# Patient Record
Sex: MALE | Race: WHITE | Employment: OTHER | URBAN - NONMETROPOLITAN AREA
[De-identification: names, ages, dates, MRNs, and addresses within clinical notes are randomized per-mention and may not be internally consistent; named-entity substitution may affect disease eponyms.]

---

## 2017-02-07 ENCOUNTER — TELEPHONE (OUTPATIENT)
Dept: NEUROLOGY | Age: 82
End: 2017-02-07

## 2017-02-18 ENCOUNTER — HOSPITAL ENCOUNTER (OUTPATIENT)
Age: 82
Setting detail: OBSERVATION
Discharge: HOME | End: 2017-02-19
Attending: EMERGENCY MEDICINE | Admitting: INTERNAL MEDICINE
Payer: MEDICARE

## 2017-02-18 ENCOUNTER — APPOINTMENT (OUTPATIENT)
Dept: GENERAL RADIOLOGY | Age: 82
End: 2017-02-18
Payer: MEDICARE

## 2017-02-18 ENCOUNTER — APPOINTMENT (OUTPATIENT)
Dept: CT IMAGING | Age: 82
End: 2017-02-18
Payer: MEDICARE

## 2017-02-18 DIAGNOSIS — R07.9 CHEST PAIN, UNSPECIFIED TYPE: Primary | ICD-10-CM

## 2017-02-18 DIAGNOSIS — R55 NEAR SYNCOPE: ICD-10-CM

## 2017-02-18 PROBLEM — E03.9 HYPOTHYROIDISM: Status: ACTIVE | Noted: 2017-02-18

## 2017-02-18 PROBLEM — I65.29 CAROTID ARTERY STENOSIS: Status: ACTIVE | Noted: 2017-02-18

## 2017-02-18 PROBLEM — R29.6 FALLS FREQUENTLY: Status: ACTIVE | Noted: 2017-02-18

## 2017-02-18 PROBLEM — I10 HYPERTENSION: Status: ACTIVE | Noted: 2017-02-18

## 2017-02-18 PROBLEM — G20 PARKINSON DISEASE (HCC): Status: ACTIVE | Noted: 2017-02-18

## 2017-02-18 PROBLEM — E78.5 HYPERLIPIDEMIA: Status: ACTIVE | Noted: 2017-02-18

## 2017-02-18 PROBLEM — I48.91 ATRIAL FIBRILLATION WITH NORMAL VENTRICULAR RATE (HCC): Status: ACTIVE | Noted: 2017-02-18

## 2017-02-18 LAB
ALBUMIN SERPL-MCNC: 4.2 G/DL (ref 3.5–5.2)
ALP BLD-CCNC: 86 U/L (ref 40–130)
ALT SERPL-CCNC: <5 U/L (ref 5–41)
ANION GAP SERPL CALCULATED.3IONS-SCNC: 16 MMOL/L (ref 7–19)
AST SERPL-CCNC: 11 U/L (ref 5–40)
BASOPHILS ABSOLUTE: 0 K/UL (ref 0–0.2)
BASOPHILS RELATIVE PERCENT: 0.3 % (ref 0–1)
BILIRUB SERPL-MCNC: 0.9 MG/DL (ref 0.2–1.2)
BUN BLDV-MCNC: 18 MG/DL (ref 8–23)
CALCIUM SERPL-MCNC: 9.1 MG/DL (ref 8.8–10.2)
CHLORIDE BLD-SCNC: 97 MMOL/L (ref 98–111)
CO2: 25 MMOL/L (ref 22–29)
CREAT SERPL-MCNC: 1.1 MG/DL (ref 0.5–1.2)
D DIMER: 0.56 NG/ML DDU (ref 0–0.48)
EOSINOPHILS ABSOLUTE: 0 K/UL (ref 0–0.6)
EOSINOPHILS RELATIVE PERCENT: 0.4 % (ref 0–5)
GFR NON-AFRICAN AMERICAN: >60
GLOBULIN: 3.4 G/DL
GLUCOSE BLD-MCNC: 125 MG/DL (ref 74–109)
HCT VFR BLD CALC: 38.8 % (ref 42–52)
HEMOGLOBIN: 13.4 G/DL (ref 14–18)
INR BLD: 1.04 (ref 0.88–1.18)
LYMPHOCYTES ABSOLUTE: 0.9 K/UL (ref 1.1–4.5)
LYMPHOCYTES RELATIVE PERCENT: 9.6 % (ref 20–40)
MCH RBC QN AUTO: 28.3 PG (ref 27–31)
MCHC RBC AUTO-ENTMCNC: 34.5 G/DL (ref 33–37)
MCV RBC AUTO: 81.9 FL (ref 80–94)
MONOCYTES ABSOLUTE: 0.4 K/UL (ref 0–0.9)
MONOCYTES RELATIVE PERCENT: 4.4 % (ref 0–10)
NEUTROPHILS ABSOLUTE: 7.7 K/UL (ref 1.5–7.5)
NEUTROPHILS RELATIVE PERCENT: 85.3 % (ref 50–65)
PDW BLD-RTO: 15 % (ref 11.5–14.5)
PERFORMED ON: NORMAL
PLATELET # BLD: 173 K/UL (ref 130–400)
PMV BLD AUTO: 10.6 FL (ref 7.4–10.4)
POC TROPONIN I: 0.01 NG/ML (ref 0–0.08)
POTASSIUM SERPL-SCNC: 3.9 MMOL/L (ref 3.5–5)
PRO-BNP: 1269 PG/ML (ref 0–1800)
PROTHROMBIN TIME: 13.6 SEC (ref 12–14.6)
RBC # BLD: 4.74 M/UL (ref 4.7–6.1)
SODIUM BLD-SCNC: 138 MMOL/L (ref 136–145)
TOTAL PROTEIN: 7.6 G/DL (ref 6.6–8.7)
TROPONIN: <0.01 NG/ML (ref 0–0.03)
TSH SERPL DL<=0.05 MIU/L-ACNC: 1.34 UIU/ML (ref 0.27–4.2)
WBC # BLD: 9 K/UL (ref 4.8–10.8)

## 2017-02-18 PROCEDURE — 84484 ASSAY OF TROPONIN QUANT: CPT

## 2017-02-18 PROCEDURE — 71275 CT ANGIOGRAPHY CHEST: CPT

## 2017-02-18 PROCEDURE — 99285 EMERGENCY DEPT VISIT HI MDM: CPT

## 2017-02-18 PROCEDURE — 71010 XR CHEST PORTABLE: CPT

## 2017-02-18 PROCEDURE — 84443 ASSAY THYROID STIM HORMONE: CPT

## 2017-02-18 PROCEDURE — 2500000003 HC RX 250 WO HCPCS: Performed by: INTERNAL MEDICINE

## 2017-02-18 PROCEDURE — 36415 COLL VENOUS BLD VENIPUNCTURE: CPT

## 2017-02-18 PROCEDURE — G0378 HOSPITAL OBSERVATION PER HR: HCPCS

## 2017-02-18 PROCEDURE — 85379 FIBRIN DEGRADATION QUANT: CPT

## 2017-02-18 PROCEDURE — 94664 DEMO&/EVAL PT USE INHALER: CPT

## 2017-02-18 PROCEDURE — 85025 COMPLETE CBC W/AUTO DIFF WBC: CPT

## 2017-02-18 PROCEDURE — 83880 ASSAY OF NATRIURETIC PEPTIDE: CPT

## 2017-02-18 PROCEDURE — 2580000003 HC RX 258: Performed by: EMERGENCY MEDICINE

## 2017-02-18 PROCEDURE — 99284 EMERGENCY DEPT VISIT MOD MDM: CPT | Performed by: EMERGENCY MEDICINE

## 2017-02-18 PROCEDURE — 2580000003 HC RX 258: Performed by: INTERNAL MEDICINE

## 2017-02-18 PROCEDURE — 6360000004 HC RX CONTRAST MEDICATION: Performed by: EMERGENCY MEDICINE

## 2017-02-18 PROCEDURE — 93005 ELECTROCARDIOGRAM TRACING: CPT

## 2017-02-18 PROCEDURE — 80053 COMPREHEN METABOLIC PANEL: CPT

## 2017-02-18 PROCEDURE — 85610 PROTHROMBIN TIME: CPT

## 2017-02-18 PROCEDURE — 99220 PR INITIAL OBSERVATION CARE/DAY 70 MINUTES: CPT | Performed by: INTERNAL MEDICINE

## 2017-02-18 RX ORDER — CARBIDOPA/LEVODOPA 25MG-250MG
1 TABLET ORAL 4 TIMES DAILY
Status: DISCONTINUED | OUTPATIENT
Start: 2017-02-18 | End: 2017-02-19 | Stop reason: HOSPADM

## 2017-02-18 RX ORDER — MIDODRINE HYDROCHLORIDE 5 MG/1
5 TABLET ORAL
Status: DISCONTINUED | OUTPATIENT
Start: 2017-02-18 | End: 2017-02-19 | Stop reason: HOSPADM

## 2017-02-18 RX ORDER — SODIUM CHLORIDE 0.9 % (FLUSH) 0.9 %
10 SYRINGE (ML) INJECTION PRN
Status: DISCONTINUED | OUTPATIENT
Start: 2017-02-18 | End: 2017-02-19 | Stop reason: HOSPADM

## 2017-02-18 RX ORDER — ONDANSETRON 2 MG/ML
4 INJECTION INTRAMUSCULAR; INTRAVENOUS EVERY 6 HOURS PRN
Status: DISCONTINUED | OUTPATIENT
Start: 2017-02-18 | End: 2017-02-19 | Stop reason: HOSPADM

## 2017-02-18 RX ORDER — ATORVASTATIN CALCIUM 20 MG/1
10 TABLET, FILM COATED ORAL DAILY
Status: DISCONTINUED | OUTPATIENT
Start: 2017-02-18 | End: 2017-02-19 | Stop reason: HOSPADM

## 2017-02-18 RX ORDER — LEVOTHYROXINE SODIUM 0.12 MG/1
125 TABLET ORAL DAILY
Status: DISCONTINUED | OUTPATIENT
Start: 2017-02-19 | End: 2017-02-19 | Stop reason: HOSPADM

## 2017-02-18 RX ORDER — CLOPIDOGREL BISULFATE 75 MG/1
75 TABLET ORAL DAILY
Status: DISCONTINUED | OUTPATIENT
Start: 2017-02-18 | End: 2017-02-19 | Stop reason: HOSPADM

## 2017-02-18 RX ORDER — ASPIRIN 81 MG/1
81 TABLET ORAL DAILY
Status: DISCONTINUED | OUTPATIENT
Start: 2017-02-19 | End: 2017-02-19 | Stop reason: HOSPADM

## 2017-02-18 RX ORDER — ACETAMINOPHEN 325 MG/1
650 TABLET ORAL EVERY 4 HOURS PRN
Status: DISCONTINUED | OUTPATIENT
Start: 2017-02-18 | End: 2017-02-19 | Stop reason: HOSPADM

## 2017-02-18 RX ORDER — 0.9 % SODIUM CHLORIDE 0.9 %
500 INTRAVENOUS SOLUTION INTRAVENOUS ONCE
Status: COMPLETED | OUTPATIENT
Start: 2017-02-18 | End: 2017-02-18

## 2017-02-18 RX ORDER — ASPIRIN 81 MG/1
81 TABLET, CHEWABLE ORAL DAILY
Status: DISCONTINUED | OUTPATIENT
Start: 2017-02-19 | End: 2017-02-18

## 2017-02-18 RX ORDER — LEVOTHYROXINE SODIUM 0.1 MG/1
100 TABLET ORAL DAILY
Status: DISCONTINUED | OUTPATIENT
Start: 2017-02-18 | End: 2017-02-18

## 2017-02-18 RX ORDER — FINASTERIDE 5 MG/1
5 TABLET, FILM COATED ORAL DAILY
Status: DISCONTINUED | OUTPATIENT
Start: 2017-02-19 | End: 2017-02-19 | Stop reason: HOSPADM

## 2017-02-18 RX ORDER — ENTACAPONE 200 MG/1
200 TABLET ORAL 4 TIMES DAILY
Status: DISCONTINUED | OUTPATIENT
Start: 2017-02-18 | End: 2017-02-19 | Stop reason: HOSPADM

## 2017-02-18 RX ORDER — SODIUM CHLORIDE 0.9 % (FLUSH) 0.9 %
10 SYRINGE (ML) INJECTION EVERY 12 HOURS SCHEDULED
Status: DISCONTINUED | OUTPATIENT
Start: 2017-02-18 | End: 2017-02-19 | Stop reason: HOSPADM

## 2017-02-18 RX ADMIN — ATORVASTATIN CALCIUM 10 MG: 20 TABLET, FILM COATED ORAL at 21:11

## 2017-02-18 RX ADMIN — Medication 10 ML: at 19:48

## 2017-02-18 RX ADMIN — ENTACAPONE 200 MG: 200 TABLET ORAL at 21:10

## 2017-02-18 RX ADMIN — Medication 1 TABLET: at 21:10

## 2017-02-18 RX ADMIN — IOVERSOL 90 ML: 741 INJECTION INTRA-ARTERIAL; INTRAVENOUS at 13:46

## 2017-02-18 RX ADMIN — SODIUM BICARBONATE: 84 INJECTION INTRAVENOUS at 19:45

## 2017-02-18 RX ADMIN — SODIUM CHLORIDE 500 ML: 9 INJECTION, SOLUTION INTRAVENOUS at 13:48

## 2017-02-18 ASSESSMENT — ENCOUNTER SYMPTOMS
ABDOMINAL PAIN: 0
SHORTNESS OF BREATH: 0
WHEEZING: 0
COUGH: 0
EYES NEGATIVE: 1
ABDOMINAL DISTENTION: 0
APNEA: 0
VOMITING: 0
NAUSEA: 0
STRIDOR: 0
SINUS PRESSURE: 0
RHINORRHEA: 0

## 2017-02-18 ASSESSMENT — PAIN SCALES - GENERAL: PAINLEVEL_OUTOF10: 0

## 2017-02-19 VITALS
DIASTOLIC BLOOD PRESSURE: 56 MMHG | WEIGHT: 240.1 LBS | SYSTOLIC BLOOD PRESSURE: 96 MMHG | OXYGEN SATURATION: 95 % | TEMPERATURE: 97.4 F | RESPIRATION RATE: 16 BRPM | HEIGHT: 72 IN | HEART RATE: 66 BPM | BODY MASS INDEX: 32.52 KG/M2

## 2017-02-19 LAB
ANION GAP SERPL CALCULATED.3IONS-SCNC: 14 MMOL/L (ref 7–19)
BASOPHILS ABSOLUTE: 0 K/UL (ref 0–0.2)
BASOPHILS RELATIVE PERCENT: 0.3 % (ref 0–1)
BUN BLDV-MCNC: 22 MG/DL (ref 8–23)
CALCIUM SERPL-MCNC: 8.4 MG/DL (ref 8.8–10.2)
CHLORIDE BLD-SCNC: 103 MMOL/L (ref 98–111)
CHOLESTEROL, TOTAL: 101 MG/DL (ref 160–199)
CO2: 24 MMOL/L (ref 22–29)
CREAT SERPL-MCNC: 1.3 MG/DL (ref 0.5–1.2)
EOSINOPHILS ABSOLUTE: 0.1 K/UL (ref 0–0.6)
EOSINOPHILS RELATIVE PERCENT: 0.7 % (ref 0–5)
GFR NON-AFRICAN AMERICAN: 52
GLUCOSE BLD-MCNC: 104 MG/DL (ref 74–109)
HCT VFR BLD CALC: 33.5 % (ref 42–52)
HDLC SERPL-MCNC: 27 MG/DL (ref 55–121)
HEMOGLOBIN: 10.9 G/DL (ref 14–18)
LDL CHOLESTEROL CALCULATED: 52 MG/DL
LYMPHOCYTES ABSOLUTE: 1.2 K/UL (ref 1.1–4.5)
LYMPHOCYTES RELATIVE PERCENT: 16.5 % (ref 20–40)
MCH RBC QN AUTO: 27.3 PG (ref 27–31)
MCHC RBC AUTO-ENTMCNC: 32.5 G/DL (ref 33–37)
MCV RBC AUTO: 84 FL (ref 80–94)
MONOCYTES ABSOLUTE: 0.4 K/UL (ref 0–0.9)
MONOCYTES RELATIVE PERCENT: 6.2 % (ref 0–10)
NEUTROPHILS ABSOLUTE: 5.4 K/UL (ref 1.5–7.5)
NEUTROPHILS RELATIVE PERCENT: 76.2 % (ref 50–65)
PDW BLD-RTO: 15 % (ref 11.5–14.5)
PLATELET # BLD: 160 K/UL (ref 130–400)
PMV BLD AUTO: 10.1 FL (ref 7.4–10.4)
POTASSIUM SERPL-SCNC: 3.8 MMOL/L (ref 3.5–5)
RBC # BLD: 3.99 M/UL (ref 4.7–6.1)
SODIUM BLD-SCNC: 141 MMOL/L (ref 136–145)
TRIGL SERPL-MCNC: 110 MG/DL (ref 150–199)
WBC # BLD: 7.1 K/UL (ref 4.8–10.8)

## 2017-02-19 PROCEDURE — 80061 LIPID PANEL: CPT

## 2017-02-19 PROCEDURE — 85025 COMPLETE CBC W/AUTO DIFF WBC: CPT

## 2017-02-19 PROCEDURE — G0378 HOSPITAL OBSERVATION PER HR: HCPCS

## 2017-02-19 PROCEDURE — 80048 BASIC METABOLIC PNL TOTAL CA: CPT

## 2017-02-19 PROCEDURE — 93306 TTE W/DOPPLER COMPLETE: CPT

## 2017-02-19 PROCEDURE — 99217 PR OBSERVATION CARE DISCHARGE MANAGEMENT: CPT | Performed by: INTERNAL MEDICINE

## 2017-02-19 PROCEDURE — 6370000000 HC RX 637 (ALT 250 FOR IP): Performed by: INTERNAL MEDICINE

## 2017-02-19 PROCEDURE — 36415 COLL VENOUS BLD VENIPUNCTURE: CPT

## 2017-02-19 RX ADMIN — ENTACAPONE 200 MG: 200 TABLET ORAL at 09:57

## 2017-02-19 RX ADMIN — LEVOTHYROXINE SODIUM 125 MCG: 125 TABLET ORAL at 06:16

## 2017-02-19 RX ADMIN — MIDODRINE HYDROCHLORIDE 5 MG: 5 TABLET ORAL at 09:55

## 2017-02-19 RX ADMIN — Medication 1 TABLET: at 17:53

## 2017-02-19 RX ADMIN — ENTACAPONE 200 MG: 200 TABLET ORAL at 13:04

## 2017-02-19 RX ADMIN — CLOPIDOGREL BISULFATE 75 MG: 75 TABLET ORAL at 09:57

## 2017-02-19 RX ADMIN — ASPIRIN 81 MG: 81 TABLET ORAL at 09:59

## 2017-02-19 RX ADMIN — Medication 1 TABLET: at 13:04

## 2017-02-19 RX ADMIN — FINASTERIDE 5 MG: 5 TABLET, FILM COATED ORAL at 09:59

## 2017-02-19 RX ADMIN — Medication 1 TABLET: at 09:56

## 2017-02-19 RX ADMIN — ENTACAPONE 200 MG: 200 TABLET ORAL at 17:53

## 2017-02-19 RX ADMIN — MIDODRINE HYDROCHLORIDE 5 MG: 5 TABLET ORAL at 13:04

## 2017-02-19 ASSESSMENT — PAIN SCALES - GENERAL
PAINLEVEL_OUTOF10: 0

## 2017-02-22 ENCOUNTER — OFFICE VISIT (OUTPATIENT)
Dept: UROLOGY | Facility: CLINIC | Age: 82
End: 2017-02-22

## 2017-02-22 VITALS — HEIGHT: 71 IN | WEIGHT: 250 LBS | BODY MASS INDEX: 35 KG/M2

## 2017-02-22 DIAGNOSIS — N32.81 OAB (OVERACTIVE BLADDER): ICD-10-CM

## 2017-02-22 DIAGNOSIS — C61 CANCER OF PROSTATE (HCC): Primary | ICD-10-CM

## 2017-02-22 LAB
EKG P AXIS: NORMAL DEGREES
EKG P-R INTERVAL: NORMAL MS
EKG Q-T INTERVAL: 394 MS
EKG QRS DURATION: 100 MS
EKG QTC CALCULATION (BAZETT): 443 MS
EKG T AXIS: -22 DEGREES

## 2017-02-22 PROCEDURE — 99213 OFFICE O/P EST LOW 20 MIN: CPT | Performed by: UROLOGY

## 2017-02-22 RX ORDER — FINASTERIDE 5 MG/1
5 TABLET, FILM COATED ORAL DAILY
COMMUNITY
End: 2017-08-04 | Stop reason: SDUPTHER

## 2017-02-22 RX ORDER — LEVOTHYROXINE SODIUM 0.07 MG/1
75 TABLET ORAL DAILY
COMMUNITY

## 2017-02-22 RX ORDER — AMIODARONE HYDROCHLORIDE 200 MG/1
200 TABLET ORAL DAILY
COMMUNITY
End: 2018-02-14

## 2017-02-22 NOTE — PROGRESS NOTES
Subjective    Mr. Whitfield is 89 y.o. male    CHIEF COMPLAINT: Prostate cancer    The patient is about 2-1/2 years now treatment for prostate cancer he had a stage TIc with a Bellevue score of 10 he is undergone external beam radiotherapy with neoadjuvant hormonal therapy he has now been off hormonal therapy since 2015 his PSAs are remaining quite low his most recent PSA is again 0.1 he is on Proscar but no other urologic medications    He does have some mild obstructive voiding symptoms with urgency frequency nocturia etc. but nothing worsening when he had previously he denies any gross hematuria again no flank pain no symptoms metastatic disease such as bone pain back pain weight loss or anorexia    He has however been having hypotensive episodes in fact when he came in the office he passed out here and pre-much on his again came back quite quickly and he says his wife says he has been thoroughly worked up for this and to follow-up with Dr. Bundy      History of Present Illness      The following portions of the patient's history were reviewed and updated as appropriate: allergies, current medications, past family history, past medical history, past social history, past surgical history and problem list.    Review of Systems   Constitutional: Positive for activity change and fatigue. Negative for chills and fever.   Gastrointestinal: Negative for abdominal distention, abdominal pain, anal bleeding, blood in stool and nausea.   Genitourinary: Positive for urgency. Negative for difficulty urinating, dysuria, flank pain, frequency, hematuria, penile pain and penile swelling.   Neurological: Positive for dizziness, syncope and light-headedness.   Psychiatric/Behavioral: Negative.  Negative for agitation and confusion.         Current Outpatient Prescriptions:   •  amiodarone (PACERONE) 200 MG tablet, Take 200 mg by mouth Daily., Disp: , Rfl:   •  finasteride (PROSCAR) 5 MG tablet, Take 5 mg by mouth Daily., Disp: ,  "Rfl:   •  FOLIC ACID PO, Take  by mouth., Disp: , Rfl:   •  HYDROCHLOROTHIAZIDE PO, Take  by mouth., Disp: , Rfl:   •  levothyroxine (SYNTHROID, LEVOTHROID) 75 MCG tablet, Take 75 mcg by mouth Daily., Disp: , Rfl:   •  METOPROLOL TARTRATE PO, Take  by mouth., Disp: , Rfl:   •  SIMVASTATIN PO, Take  by mouth., Disp: , Rfl:   •  VALSARTAN PO, Take  by mouth., Disp: , Rfl:     Past Medical History   Diagnosis Date   • BPH (benign prostatic hypertrophy) with urinary obstruction    • Elevated PSA    • Hypertension    • Parkinson disease        Past Surgical History   Procedure Laterality Date   • Cardiac catheterization         Social History     Social History   • Marital status:      Spouse name: N/A   • Number of children: N/A   • Years of education: N/A     Social History Main Topics   • Smoking status: Never Smoker   • Smokeless tobacco: None   • Alcohol use Yes      Comment: occasionally   • Drug use: No   • Sexual activity: Not Asked     Other Topics Concern   • None     Social History Narrative       Family History   Problem Relation Age of Onset   • Heart disease Father    • Cancer Mother        Objective    Visit Vitals   • Ht 71\" (180.3 cm)   • Wt 250 lb (113 kg)   • BMI 34.87 kg/m2       Physical Exam      Lab Requisition on 11/07/2016   Component Date Value Ref Range Status   • Case Report 11/07/2016    Final                    Value:Surgical Pathology Report                         Case: [id number]                                  Authorizing Provider:  Scottie Mix MD           Collected:           11/07/2016 09:05 AM          Pathologist:           Leonid Sanchez MD    Received:            11/08/2016 09:06 AM          Specimens:   1) - Colon, Colon polyp at 25 cm                                                                    2) - Colon, Colon polyp at 135 cm                                                         • Clinical Information 11/07/2016    Final                    Value:This " "result contains rich text formatting which cannot be displayed here.   • Final Diagnosis 11/07/2016    Final                    Value:This result contains rich text formatting which cannot be displayed here.   • Gross Description 11/07/2016    Final                    Value:This result contains rich text formatting which cannot be displayed here.   • Microscopic Description 11/07/2016    Final                    Value:This result contains rich text formatting which cannot be displayed here.       Results for orders placed or performed in visit on 11/07/16   Tissue Exam   Result Value Ref Range    Case Report       Surgical Pathology Report                         Case: [id number]                                  Authorizing Provider:  Scottie Mix MD           Collected:           11/07/2016 09:05 AM          Pathologist:           Leonid Sanchez MD    Received:            11/08/2016 09:06 AM          Specimens:   1) - Colon, Colon polyp at 25 cm                                                                    2) - Colon, Colon polyp at 135 cm                                                          Clinical Information       Pre-Op Diagnosis:      History of colon polyps.       Final Diagnosis       1.     Polyp, colon at 25 cm, polypectomy:               Tubular adenoma with no evidence of high grade dysplasia.    2.     Polyp, colon at 135 cm, polypectomy:               Tubular adenoma with no evidence of high grade dysplasia.                                          1      Gross Description       Specimen #1 is received in formalin, labeled with the patient's name, medical record number and \"colon polyp at 25 cm.\" The specimen consists of one red-brown soft tissue polyp measuring 0.4 x 0.4 x 0.2 cm. The polyp is bisected and totally submitted in (block 1A).      Specimen #2 is received in formalin, labeled with the patient's name, medical record number and \"colon polyp at 135 cm.\"  The specimen consists " of three yellow-pink soft tissue fragments aggregating to 0.6 x 0.5 x 0.1 cm, totally submitted in (block 2A).   JBT/js          Microscopic Description       1.     Sections demonstrate polypoid fragments of colonic mucosa with glandular architecture and low grade adenomatous changes.  No evidence of high grade dysplasia is seen.    2.     Sections demonstrate polypoid fragments of colonic mucosa with glandular architecture and low grade adenomatous changes.  No evidence of high grade dysplasia is seen.  DRW/ltw        Embedded Images         Assessment and Plan    Javier was seen today for cancer of prostate.    Diagnoses and all orders for this visit:    Cancer of prostate    OAB (overactive bladder)    Plan--the patient seems to doing well from a prostate cancer point review again he is having more more difficulties with ambulation with a syncopal episodes etc. so I suggested in 6 months if he can come in just have home health draw a PSA and we can just call running those results.    Again he still hormonal therapy with an EGD so I do not think we needed anything different at this point and we will just seen back again in 6 months call sooner as needed and I gave him I wished him well    EMR Dragon/Transcription disclaimer:  Much of this encounter note is an electronic transcription/translation of spoken language to printed text. The electronic translation of spoken language may permit erroneous, or at times, nonsensical words or phrases to be inadvertently transcribed; although I have reviewed the note for such errors, some may still exist.

## 2017-04-19 ENCOUNTER — OFFICE VISIT (OUTPATIENT)
Dept: NEUROLOGY | Age: 82
End: 2017-04-19
Payer: MEDICARE

## 2017-04-19 VITALS
WEIGHT: 250 LBS | SYSTOLIC BLOOD PRESSURE: 154 MMHG | BODY MASS INDEX: 35 KG/M2 | HEART RATE: 69 BPM | HEIGHT: 71 IN | DIASTOLIC BLOOD PRESSURE: 72 MMHG

## 2017-04-19 DIAGNOSIS — G20 PARKINSONIAN SYNDROME ASSOCIATED WITH IDIOPATHIC ORTHOSTATIC HYPOTENSION (HCC): Primary | ICD-10-CM

## 2017-04-19 DIAGNOSIS — I65.1 BASILAR ARTERY STENOSIS: ICD-10-CM

## 2017-04-19 DIAGNOSIS — G62.9 NEUROPATHY: ICD-10-CM

## 2017-04-19 DIAGNOSIS — G20 PARKINSON DISEASE (HCC): ICD-10-CM

## 2017-04-19 DIAGNOSIS — I95.1 PARKINSONIAN SYNDROME ASSOCIATED WITH IDIOPATHIC ORTHOSTATIC HYPOTENSION (HCC): Primary | ICD-10-CM

## 2017-04-19 PROCEDURE — G8417 CALC BMI ABV UP PARAM F/U: HCPCS | Performed by: PSYCHIATRY & NEUROLOGY

## 2017-04-19 PROCEDURE — G8598 ASA/ANTIPLAT THER USED: HCPCS | Performed by: PSYCHIATRY & NEUROLOGY

## 2017-04-19 PROCEDURE — G8427 DOCREV CUR MEDS BY ELIG CLIN: HCPCS | Performed by: PSYCHIATRY & NEUROLOGY

## 2017-04-19 PROCEDURE — 4040F PNEUMOC VAC/ADMIN/RCVD: CPT | Performed by: PSYCHIATRY & NEUROLOGY

## 2017-04-19 PROCEDURE — 99214 OFFICE O/P EST MOD 30 MIN: CPT | Performed by: PSYCHIATRY & NEUROLOGY

## 2017-04-19 PROCEDURE — 1036F TOBACCO NON-USER: CPT | Performed by: PSYCHIATRY & NEUROLOGY

## 2017-04-19 PROCEDURE — 1123F ACP DISCUSS/DSCN MKR DOCD: CPT | Performed by: PSYCHIATRY & NEUROLOGY

## 2017-07-20 ENCOUNTER — OFFICE VISIT (OUTPATIENT)
Dept: NEUROLOGY | Age: 82
End: 2017-07-20
Payer: MEDICARE

## 2017-07-20 VITALS
BODY MASS INDEX: 32.9 KG/M2 | HEIGHT: 71 IN | SYSTOLIC BLOOD PRESSURE: 120 MMHG | DIASTOLIC BLOOD PRESSURE: 80 MMHG | WEIGHT: 235 LBS

## 2017-07-20 DIAGNOSIS — I65.1 BASILAR ARTERY STENOSIS: ICD-10-CM

## 2017-07-20 DIAGNOSIS — G20 PARKINSONIAN SYNDROME ASSOCIATED WITH IDIOPATHIC ORTHOSTATIC HYPOTENSION (HCC): ICD-10-CM

## 2017-07-20 DIAGNOSIS — G20 PARKINSON DISEASE (HCC): Primary | ICD-10-CM

## 2017-07-20 DIAGNOSIS — I95.1 PARKINSONIAN SYNDROME ASSOCIATED WITH IDIOPATHIC ORTHOSTATIC HYPOTENSION (HCC): ICD-10-CM

## 2017-07-20 PROCEDURE — 99213 OFFICE O/P EST LOW 20 MIN: CPT | Performed by: PSYCHIATRY & NEUROLOGY

## 2017-07-20 PROCEDURE — G8598 ASA/ANTIPLAT THER USED: HCPCS | Performed by: PSYCHIATRY & NEUROLOGY

## 2017-07-20 PROCEDURE — 1036F TOBACCO NON-USER: CPT | Performed by: PSYCHIATRY & NEUROLOGY

## 2017-07-20 PROCEDURE — 4040F PNEUMOC VAC/ADMIN/RCVD: CPT | Performed by: PSYCHIATRY & NEUROLOGY

## 2017-07-20 PROCEDURE — G8427 DOCREV CUR MEDS BY ELIG CLIN: HCPCS | Performed by: PSYCHIATRY & NEUROLOGY

## 2017-07-20 PROCEDURE — 1123F ACP DISCUSS/DSCN MKR DOCD: CPT | Performed by: PSYCHIATRY & NEUROLOGY

## 2017-07-20 PROCEDURE — G8417 CALC BMI ABV UP PARAM F/U: HCPCS | Performed by: PSYCHIATRY & NEUROLOGY

## 2017-08-04 DIAGNOSIS — N32.81 OAB (OVERACTIVE BLADDER): Primary | ICD-10-CM

## 2017-08-04 RX ORDER — FINASTERIDE 5 MG/1
TABLET, FILM COATED ORAL
Qty: 90 TABLET | Refills: 0 | Status: SHIPPED | OUTPATIENT
Start: 2017-08-04 | End: 2017-10-27 | Stop reason: SDUPTHER

## 2017-08-23 ENCOUNTER — OFFICE VISIT (OUTPATIENT)
Dept: UROLOGY | Facility: CLINIC | Age: 82
End: 2017-08-23

## 2017-08-23 VITALS
SYSTOLIC BLOOD PRESSURE: 121 MMHG | BODY MASS INDEX: 35 KG/M2 | TEMPERATURE: 98.6 F | WEIGHT: 250 LBS | HEIGHT: 71 IN | DIASTOLIC BLOOD PRESSURE: 54 MMHG

## 2017-08-23 DIAGNOSIS — C61 CANCER OF PROSTATE (HCC): Primary | ICD-10-CM

## 2017-08-23 DIAGNOSIS — N32.81 OAB (OVERACTIVE BLADDER): ICD-10-CM

## 2017-08-23 LAB
BILIRUB BLD-MCNC: NEGATIVE MG/DL
CLARITY, POC: CLEAR
COLOR UR: YELLOW
GLUCOSE UR STRIP-MCNC: NEGATIVE MG/DL
KETONES UR QL: ABNORMAL
LEUKOCYTE EST, POC: NEGATIVE
NITRITE UR-MCNC: NEGATIVE MG/ML
PH UR: 5.5 [PH] (ref 5–8)
PROT UR STRIP-MCNC: NEGATIVE MG/DL
RBC # UR STRIP: NEGATIVE /UL
SP GR UR: 1.02 (ref 1–1.03)
UROBILINOGEN UR QL: NORMAL

## 2017-08-23 PROCEDURE — 81003 URINALYSIS AUTO W/O SCOPE: CPT | Performed by: UROLOGY

## 2017-08-23 PROCEDURE — 99213 OFFICE O/P EST LOW 20 MIN: CPT | Performed by: UROLOGY

## 2017-08-23 NOTE — PROGRESS NOTES
Subjective    Mr. Whitfield is 89 y.o. male    CHIEF COMPLAINT: Prostate cancer    Patient comes back today for follow-up he is had a Gregory's grade 8 status post radiation and hormonal therapy several years ago he is been on hormones now for couple years and seems to doing just just fine he is 89 years old now he is somewhat weak but that he is doing much better than when I saw him 6 months ago infected that point I did not think 100 to seen back in office again but he is a workup and seems doing better.    He has no symptoms metastatic disease no specific bone pain back pain weight loss or anorexia his most recent PSA is 0.    He denies any significant voiding habits no gross hematuria no flank pain burning stinging etc.      History of Present Illness      The following portions of the patient's history were reviewed and updated as appropriate: allergies, current medications, past family history, past medical history, past social history, past surgical history and problem list.    Review of Systems   Constitutional: Positive for fatigue. Negative for activity change and appetite change.   Gastrointestinal: Negative for abdominal distention, abdominal pain, blood in stool and nausea.   Genitourinary: Negative for difficulty urinating, dysuria, flank pain, frequency, hematuria and urgency.   Psychiatric/Behavioral: Negative.  Negative for agitation and confusion.         Current Outpatient Prescriptions:   •  amiodarone (PACERONE) 200 MG tablet, Take 200 mg by mouth Daily., Disp: , Rfl:   •  finasteride (PROSCAR) 5 MG tablet, TAKE ONE TABLET BY MOUTH ONCE DAILY, Disp: 90 tablet, Rfl: 0  •  levothyroxine (SYNTHROID, LEVOTHROID) 75 MCG tablet, Take 75 mcg by mouth Daily., Disp: , Rfl:   •  SIMVASTATIN PO, Take  by mouth., Disp: , Rfl:   •  VALSARTAN PO, Take  by mouth., Disp: , Rfl:     Past Medical History:   Diagnosis Date   • BPH (benign prostatic hypertrophy) with urinary obstruction    • Elevated PSA    •  "Hypertension    • Parkinson disease        Past Surgical History:   Procedure Laterality Date   • CARDIAC CATHETERIZATION         Social History     Social History   • Marital status:      Spouse name: N/A   • Number of children: N/A   • Years of education: N/A     Social History Main Topics   • Smoking status: Never Smoker   • Smokeless tobacco: None   • Alcohol use Yes      Comment: occasionally   • Drug use: No   • Sexual activity: Not Asked     Other Topics Concern   • None     Social History Narrative       Family History   Problem Relation Age of Onset   • Heart disease Father    • Cancer Mother        Objective    /54  Temp 98.6 °F (37 °C)  Ht 71\" (180.3 cm)  Wt 250 lb (113 kg)  BMI 34.87 kg/m2    Physical Exam      Lab Requisition on 11/07/2016   Component Date Value Ref Range Status   • Case Report 11/07/2016    Final                    Value:Surgical Pathology Report                         Case: [id number]                                  Authorizing Provider:  Scottie Mix MD           Collected:           11/07/2016 09:05 AM          Pathologist:           Leonid Sanchez MD    Received:            11/08/2016 09:06 AM          Specimens:   1) - Colon, Colon polyp at 25 cm                                                                    2) - Colon, Colon polyp at 135 cm                                                         • Clinical Information 11/07/2016    Final                    Value:This result contains rich text formatting which cannot be displayed here.   • Final Diagnosis 11/07/2016    Final                    Value:This result contains rich text formatting which cannot be displayed here.   • Gross Description 11/07/2016    Final                    Value:This result contains rich text formatting which cannot be displayed here.   • Microscopic Description 11/07/2016    Final                    Value:This result contains rich text formatting which cannot be displayed " here.       Results for orders placed or performed in visit on 08/23/17   POC Urinalysis Dipstick, Automated   Result Value Ref Range    Color Yellow Yellow, Straw, Dark Yellow, Mayra    Clarity, UA Clear Clear    Glucose, UA Negative Negative, 1000 mg/dL (3+) mg/dL    Bilirubin Negative Negative    Ketones, UA Trace (A) Negative    Specific Gravity  1.020 1.005 - 1.030    Blood, UA Negative Negative    pH, Urine 5.5 5.0 - 8.0    Protein, POC Negative Negative mg/dL    Urobilinogen, UA Normal Normal    Leukocytes Negative Negative    Nitrite, UA Negative Negative       Assessment and Plan    Javier was seen today for overactive bladder.    Diagnoses and all orders for this visit:    Cancer of prostate  -     PSA; Future    OAB (overactive bladder)  -     POC Urinalysis Dipstick, Automated      Plan--patient seems to doing well he is here today with his wife his PSA remains 0 he is having no specific symptoms of metastatic disease.    We will seen back in 6 months then with a PSA he will call sooner as needed

## 2017-09-26 ENCOUNTER — TELEPHONE (OUTPATIENT)
Dept: NEUROLOGY | Age: 82
End: 2017-09-26

## 2017-10-27 DIAGNOSIS — N32.81 OAB (OVERACTIVE BLADDER): ICD-10-CM

## 2017-10-30 RX ORDER — FINASTERIDE 5 MG/1
TABLET, FILM COATED ORAL
Qty: 90 TABLET | Refills: 0 | Status: SHIPPED | OUTPATIENT
Start: 2017-10-30 | End: 2018-02-04 | Stop reason: SDUPTHER

## 2017-11-09 ENCOUNTER — OFFICE VISIT (OUTPATIENT)
Dept: NEUROLOGY | Age: 82
End: 2017-11-09
Payer: MEDICARE

## 2017-11-09 VITALS — SYSTOLIC BLOOD PRESSURE: 156 MMHG | HEART RATE: 68 BPM | DIASTOLIC BLOOD PRESSURE: 76 MMHG

## 2017-11-09 DIAGNOSIS — I65.1 BASILAR ARTERY STENOSIS: ICD-10-CM

## 2017-11-09 DIAGNOSIS — G20 PARKINSONIAN SYNDROME ASSOCIATED WITH IDIOPATHIC ORTHOSTATIC HYPOTENSION (HCC): ICD-10-CM

## 2017-11-09 DIAGNOSIS — I95.1 PARKINSONIAN SYNDROME ASSOCIATED WITH IDIOPATHIC ORTHOSTATIC HYPOTENSION (HCC): ICD-10-CM

## 2017-11-09 DIAGNOSIS — G20 PARKINSON DISEASE (HCC): Primary | ICD-10-CM

## 2017-11-09 DIAGNOSIS — G62.9 NEUROPATHY: ICD-10-CM

## 2017-11-09 PROCEDURE — G8417 CALC BMI ABV UP PARAM F/U: HCPCS | Performed by: PSYCHIATRY & NEUROLOGY

## 2017-11-09 PROCEDURE — G8484 FLU IMMUNIZE NO ADMIN: HCPCS | Performed by: PSYCHIATRY & NEUROLOGY

## 2017-11-09 PROCEDURE — G8427 DOCREV CUR MEDS BY ELIG CLIN: HCPCS | Performed by: PSYCHIATRY & NEUROLOGY

## 2017-11-09 PROCEDURE — 1036F TOBACCO NON-USER: CPT | Performed by: PSYCHIATRY & NEUROLOGY

## 2017-11-09 PROCEDURE — 4040F PNEUMOC VAC/ADMIN/RCVD: CPT | Performed by: PSYCHIATRY & NEUROLOGY

## 2017-11-09 PROCEDURE — G8598 ASA/ANTIPLAT THER USED: HCPCS | Performed by: PSYCHIATRY & NEUROLOGY

## 2017-11-09 PROCEDURE — 1123F ACP DISCUSS/DSCN MKR DOCD: CPT | Performed by: PSYCHIATRY & NEUROLOGY

## 2017-11-09 PROCEDURE — 99214 OFFICE O/P EST MOD 30 MIN: CPT | Performed by: PSYCHIATRY & NEUROLOGY

## 2017-11-09 NOTE — PROGRESS NOTES
Review of Systems    Constitutional  No fever or chills. yes diaphoresis or significant fatigue. HENT   No tinnitus or significant hearing loss. Eyes  no sudden vision change or eye pain  Respiratory  no significant shortness of breath or cough  Cardiovascular  yes chest pain No palpitations or significant leg swelling  Gastrointestinal  no abdominal swelling or pain. Genitourinary  No difficulty urinating, dysuria  Musculoskeletal  no back pain or myalgia. Skin  no color change or rash  Neurologic  No seizures. No lateralizing weakness. Hematologic  yes easy bruising or excessive bleeding. Psychiatric  no severe anxiety or nervousness. All other review of systems are negative.

## 2017-11-10 NOTE — PROGRESS NOTES
Margarita Lopez is a 80y.o. year old male who is seen for evaluation of Parkinson's disease with orthostatic hypotension, mid basilar occlusion and neuropathy. he takes Comtan and Sinemet to 4 times a day. Oftentimes however they forget the 4th dose. He has occasional dyskinesias but in general feels like his Parkinson's disease is doing extremely well at least compared to others. . We had a long discussion regarding blood pressure management today. He takes his blood pressure about twice a day and some days will take a Norvasc and some days take Midodrine . He has been able to avoid any further episodes of syncope for months and months now. Systolic blood pressure around 140 or 150 only on occasion. Neuropathy symptoms are about the same. He says that his prostate cancer is in remission. He says that his neuropathy symptoms have a send it to about his knees and stopped. He has had physical therapy for gait training. . We had a long talk regarding all the above.     Margarita Lopez is a 80 y.o. male with the following history as recorded in [facility]:  Patient Active Problem List    Diagnosis Date Noted    Chest pain 02/18/2017    Atrial fibrillation with normal ventricular rate ([facility] [address] 02/18/2017    Carotid artery stenosis 02/18/2017    Parkinson disease ([facility] [address] 02/18/2017    Falls frequently 02/18/2017    Hypothyroidism 02/18/2017    Hypertension 02/18/2017    Hyperlipidemia 02/18/2017     Current Outpatient Prescriptions   Medication Sig Dispense Refill    Lift Chair MISC by Does not apply route Dx: Parkinson's disease 1 each 0    midodrine (PROAMATINE) 2.5 MG tablet Take 1 tablet by mouth 3 times daily (Patient taking differently: Take 2.5 mg by mouth 3 times daily as needed ) 90 tablet 3    carbidopa-levodopa (SINEMET)  MG per tablet Take 1 tablet by mouth 4 times daily 360 tablet 3    entacapone (COMTAN) 200 MG tablet Take 1 tablet by mouth 4 times daily 360 tablet 3    levothyroxine (SYNTHROID) 100

## 2018-01-01 ENCOUNTER — OFFICE VISIT (OUTPATIENT)
Dept: UROLOGY | Facility: CLINIC | Age: 83
End: 2018-01-01

## 2018-01-01 VITALS — HEIGHT: 71 IN | BODY MASS INDEX: 30.24 KG/M2 | WEIGHT: 216 LBS

## 2018-01-01 DIAGNOSIS — C61 CANCER OF PROSTATE (HCC): Primary | ICD-10-CM

## 2018-01-01 DIAGNOSIS — N32.81 OAB (OVERACTIVE BLADDER): ICD-10-CM

## 2018-01-01 DIAGNOSIS — C61 CANCER OF PROSTATE (HCC): ICD-10-CM

## 2018-01-01 PROCEDURE — 99213 OFFICE O/P EST LOW 20 MIN: CPT | Performed by: UROLOGY

## 2018-02-04 DIAGNOSIS — N32.81 OAB (OVERACTIVE BLADDER): ICD-10-CM

## 2018-02-05 RX ORDER — FINASTERIDE 5 MG/1
TABLET, FILM COATED ORAL
Qty: 90 TABLET | Refills: 0 | Status: SHIPPED | OUTPATIENT
Start: 2018-02-05

## 2018-02-14 ENCOUNTER — OFFICE VISIT (OUTPATIENT)
Dept: UROLOGY | Facility: CLINIC | Age: 83
End: 2018-02-14

## 2018-02-14 VITALS
DIASTOLIC BLOOD PRESSURE: 48 MMHG | HEIGHT: 71 IN | TEMPERATURE: 98.5 F | BODY MASS INDEX: 31.5 KG/M2 | SYSTOLIC BLOOD PRESSURE: 82 MMHG | WEIGHT: 225 LBS

## 2018-02-14 DIAGNOSIS — N32.81 OAB (OVERACTIVE BLADDER): ICD-10-CM

## 2018-02-14 DIAGNOSIS — C61 CANCER OF PROSTATE (HCC): Primary | ICD-10-CM

## 2018-02-14 LAB
BILIRUB BLD-MCNC: ABNORMAL MG/DL
CLARITY, POC: CLEAR
COLOR UR: YELLOW
GLUCOSE UR STRIP-MCNC: NEGATIVE MG/DL
KETONES UR QL: ABNORMAL
LEUKOCYTE EST, POC: NEGATIVE
NITRITE UR-MCNC: NEGATIVE MG/ML
PH UR: 5 [PH] (ref 5–8)
PROT UR STRIP-MCNC: ABNORMAL MG/DL
RBC # UR STRIP: NEGATIVE /UL
SP GR UR: 1.02 (ref 1–1.03)
UROBILINOGEN UR QL: NORMAL

## 2018-02-14 PROCEDURE — 99213 OFFICE O/P EST LOW 20 MIN: CPT | Performed by: UROLOGY

## 2018-02-14 PROCEDURE — 81003 URINALYSIS AUTO W/O SCOPE: CPT | Performed by: UROLOGY

## 2018-02-14 RX ORDER — LISINOPRIL 2.5 MG/1
2.5 TABLET ORAL DAILY
COMMUNITY

## 2018-02-14 RX ORDER — CARBIDOPA/LEVODOPA 25MG-250MG
TABLET ORAL
COMMUNITY
Start: 2016-06-22

## 2018-02-14 RX ORDER — ENTACAPONE 200 MG/1
TABLET ORAL
COMMUNITY
Start: 2016-06-22

## 2018-02-14 RX ORDER — DABIGATRAN ETEXILATE 75 MG/1
75 CAPSULE ORAL 2 TIMES DAILY
COMMUNITY

## 2018-02-14 NOTE — PROGRESS NOTES
Subjective    Mr. Whitfield is 89 y.o. male    CHIEF COMPLAINT: Prostate cancer    The patient comes in today for follow-up of prostate cancer he is here with his wife.    Clinically he seems to doing well other than being weak he is having no specific symptoms of prostate cancer such as bone pain back pain weight loss or anorexia his Parkinson's disease really is been bothering him.    It most recent PSA is 0 he is been off shots now for 6 months to a year    He also has some overactive irritable bladder symptoms but these seem to be stable as well he is not had any gross hematuria is had no flank pain little nocturia frequency and urgency but nothing worsening these had before and he is comfortable with the symptoms at this time his urinalysis is clear plan      History of Present Illness      The following portions of the patient's history were reviewed and updated as appropriate: allergies, current medications, past family history, past medical history, past social history, past surgical history and problem list.    Review of Systems   Constitutional: Negative.  Negative for chills and fever.   Gastrointestinal: Negative for abdominal distention, abdominal pain, anal bleeding, blood in stool and nausea.   Genitourinary: Positive for frequency and urgency. Negative for difficulty urinating, dysuria, flank pain and hematuria.   Psychiatric/Behavioral: Negative.  Negative for agitation and confusion.         Current Outpatient Prescriptions:   •  carbidopa-levodopa (SINEMET)  MG per tablet, Take  by mouth., Disp: , Rfl:   •  dabigatran etexilate (PRADAXA) 75 MG capsule, Take 75 mg by mouth 2 (Two) Times a Day., Disp: , Rfl:   •  entacapone (COMTAN) 200 MG tablet, Take  by mouth., Disp: , Rfl:   •  lisinopril (PRINIVIL,ZESTRIL) 2.5 MG tablet, Take 2.5 mg by mouth Daily., Disp: , Rfl:   •  finasteride (PROSCAR) 5 MG tablet, TAKE ONE TABLET BY MOUTH ONCE DAILY, Disp: 90 tablet, Rfl: 0  •  levothyroxine (SYNTHROID,  "LEVOTHROID) 75 MCG tablet, Take 75 mcg by mouth Daily., Disp: , Rfl:   •  SIMVASTATIN PO, Take  by mouth., Disp: , Rfl:   •  VALSARTAN PO, Take  by mouth., Disp: , Rfl:     Past Medical History:   Diagnosis Date   • BPH (benign prostatic hypertrophy) with urinary obstruction    • Elevated PSA    • Hypertension    • Parkinson disease        Past Surgical History:   Procedure Laterality Date   • CARDIAC CATHETERIZATION         Social History     Social History   • Marital status:      Spouse name: N/A   • Number of children: N/A   • Years of education: N/A     Social History Main Topics   • Smoking status: Never Smoker   • Smokeless tobacco: Never Used   • Alcohol use Yes      Comment: occasionally   • Drug use: No   • Sexual activity: Not Asked     Other Topics Concern   • None     Social History Narrative       Family History   Problem Relation Age of Onset   • Heart disease Father    • Cancer Mother        Objective    BP (!) 82/48  Temp 98.5 °F (36.9 °C)  Ht 180.3 cm (71\")  Wt 102 kg (225 lb)  BMI 31.38 kg/m2    Physical Exam      Office Visit on 08/23/2017   Component Date Value Ref Range Status   • Color 08/23/2017 Yellow  Yellow, Straw, Dark Yellow, Mayra Final   • Clarity, UA 08/23/2017 Clear  Clear Final   • Glucose, UA 08/23/2017 Negative  Negative, 1000 mg/dL (3+) mg/dL Final   • Bilirubin 08/23/2017 Negative  Negative Final   • Ketones, UA 08/23/2017 Trace* Negative Final   • Specific Gravity  08/23/2017 1.020  1.005 - 1.030 Final   • Blood, UA 08/23/2017 Negative  Negative Final   • pH, Urine 08/23/2017 5.5  5.0 - 8.0 Final   • Protein, POC 08/23/2017 Negative  Negative mg/dL Final   • Urobilinogen, UA 08/23/2017 Normal  Normal Final   • Leukocytes 08/23/2017 Negative  Negative Final   • Nitrite, UA 08/23/2017 Negative  Negative Final       Results for orders placed or performed in visit on 02/14/18   POC Urinalysis Dipstick, Automated   Result Value Ref Range    Color Yellow Yellow, Straw, " Dark Yellow, Mayra    Clarity, UA Clear Clear    Glucose, UA Negative Negative, 1000 mg/dL (3+) mg/dL    Bilirubin Small (1+) (A) Negative    Ketones, UA 15 mg/dL (A) Negative    Specific Gravity  1.025 1.005 - 1.030    Blood, UA Negative Negative    pH, Urine 5.0 5.0 - 8.0    Protein, POC 30 mg/dL (A) Negative mg/dL    Urobilinogen, UA Normal Normal    Leukocytes Negative Negative    Nitrite, UA Negative Negative       Assessment and Plan    Javier was seen today for cancer of prostate.    Diagnoses and all orders for this visit:    Cancer of prostate  -     POC Urinalysis Dipstick, Automated  -     PSA DIAGNOSTIC; Future    OAB (overactive bladder)    Plan--the patient is doing well with his history of prostate cancer status post radiation hormonal therapy is no pedal rate hormonal therapy for quite some time and his PSA remains 0.    He is still somewhat anxious about his cancer but I told him again the PSA should start going up along as significant symptoms so hopefully have reassured he and his wife.    We will seen back in 6 months with a PSA call sooner as needed

## 2018-03-01 ENCOUNTER — APPOINTMENT (OUTPATIENT)
Dept: CT IMAGING | Facility: HOSPITAL | Age: 83
End: 2018-03-01

## 2018-03-01 ENCOUNTER — HOSPITAL ENCOUNTER (OUTPATIENT)
Facility: HOSPITAL | Age: 83
Setting detail: OBSERVATION
Discharge: HOME | End: 2018-03-02
Attending: FAMILY MEDICINE | Admitting: FAMILY MEDICINE

## 2018-03-01 DIAGNOSIS — J90 PLEURAL EFFUSION: ICD-10-CM

## 2018-03-01 DIAGNOSIS — R07.9 CHEST PAIN, UNSPECIFIED TYPE: Primary | ICD-10-CM

## 2018-03-01 LAB
APTT PPP: 48.7 SECONDS (ref 24.1–34.8)
BILIRUB UR QL STRIP: NEGATIVE
CLARITY UR: CLEAR
COLOR UR: YELLOW
D DIMER PPP FEU-MCNC: 0.39 MG/L (FEU) (ref 0–0.5)
GLUCOSE UR STRIP-MCNC: NEGATIVE MG/DL
HGB UR QL STRIP.AUTO: NEGATIVE
INR PPP: 1.36 (ref 0.91–1.09)
KETONES UR QL STRIP: NEGATIVE
LEUKOCYTE ESTERASE UR QL STRIP.AUTO: NEGATIVE
NITRITE UR QL STRIP: NEGATIVE
PH UR STRIP.AUTO: <=5 [PH] (ref 5–8)
PROT UR QL STRIP: NEGATIVE
PROTHROMBIN TIME: 17.2 SECONDS (ref 11.9–14.6)
SP GR UR STRIP: >1.03 (ref 1–1.03)
TROPONIN I SERPL-MCNC: 0.02 NG/ML (ref 0–0.03)
TROPONIN I SERPL-MCNC: 0.32 NG/ML (ref 0–0.03)
UROBILINOGEN UR QL STRIP: ABNORMAL

## 2018-03-01 PROCEDURE — 0 IOPAMIDOL PER 1 ML: Performed by: NURSE PRACTITIONER

## 2018-03-01 PROCEDURE — 85730 THROMBOPLASTIN TIME PARTIAL: CPT | Performed by: NURSE PRACTITIONER

## 2018-03-01 PROCEDURE — 93010 ELECTROCARDIOGRAM REPORT: CPT | Performed by: INTERNAL MEDICINE

## 2018-03-01 PROCEDURE — 71275 CT ANGIOGRAPHY CHEST: CPT

## 2018-03-01 PROCEDURE — G0378 HOSPITAL OBSERVATION PER HR: HCPCS

## 2018-03-01 PROCEDURE — 84484 ASSAY OF TROPONIN QUANT: CPT | Performed by: NURSE PRACTITIONER

## 2018-03-01 PROCEDURE — 99284 EMERGENCY DEPT VISIT MOD MDM: CPT

## 2018-03-01 PROCEDURE — 85379 FIBRIN DEGRADATION QUANT: CPT | Performed by: NURSE PRACTITIONER

## 2018-03-01 PROCEDURE — 96361 HYDRATE IV INFUSION ADD-ON: CPT

## 2018-03-01 PROCEDURE — 85610 PROTHROMBIN TIME: CPT | Performed by: NURSE PRACTITIONER

## 2018-03-01 PROCEDURE — 84484 ASSAY OF TROPONIN QUANT: CPT | Performed by: FAMILY MEDICINE

## 2018-03-01 PROCEDURE — 81003 URINALYSIS AUTO W/O SCOPE: CPT | Performed by: FAMILY MEDICINE

## 2018-03-01 PROCEDURE — 93005 ELECTROCARDIOGRAM TRACING: CPT | Performed by: NURSE PRACTITIONER

## 2018-03-01 PROCEDURE — 93005 ELECTROCARDIOGRAM TRACING: CPT

## 2018-03-01 RX ORDER — SODIUM CHLORIDE 9 MG/ML
100 INJECTION, SOLUTION INTRAVENOUS CONTINUOUS
Status: DISCONTINUED | OUTPATIENT
Start: 2018-03-01 | End: 2018-03-02

## 2018-03-01 RX ORDER — CARBIDOPA/LEVODOPA 25MG-250MG
1 TABLET ORAL EVERY OTHER DAY
Status: DISCONTINUED | OUTPATIENT
Start: 2018-03-01 | End: 2018-03-02 | Stop reason: HOSPADM

## 2018-03-01 RX ORDER — LEVOTHYROXINE SODIUM 0.07 MG/1
75 TABLET ORAL DAILY
Status: DISCONTINUED | OUTPATIENT
Start: 2018-03-02 | End: 2018-03-02 | Stop reason: HOSPADM

## 2018-03-01 RX ORDER — ATORVASTATIN CALCIUM 40 MG/1
80 TABLET, FILM COATED ORAL NIGHTLY
Status: DISCONTINUED | OUTPATIENT
Start: 2018-03-01 | End: 2018-03-02 | Stop reason: HOSPADM

## 2018-03-01 RX ORDER — ENTACAPONE 200 MG/1
200 TABLET ORAL EVERY 12 HOURS SCHEDULED
Status: DISCONTINUED | OUTPATIENT
Start: 2018-03-01 | End: 2018-03-02 | Stop reason: HOSPADM

## 2018-03-01 RX ORDER — MORPHINE SULFATE 2 MG/ML
1 INJECTION, SOLUTION INTRAMUSCULAR; INTRAVENOUS EVERY 4 HOURS PRN
Status: DISCONTINUED | OUTPATIENT
Start: 2018-03-01 | End: 2018-03-02 | Stop reason: HOSPADM

## 2018-03-01 RX ORDER — DABIGATRAN ETEXILATE 75 MG/1
75 CAPSULE ORAL EVERY 12 HOURS SCHEDULED
Status: DISCONTINUED | OUTPATIENT
Start: 2018-03-01 | End: 2018-03-02 | Stop reason: HOSPADM

## 2018-03-01 RX ORDER — LISINOPRIL 2.5 MG/1
2.5 TABLET ORAL DAILY
Status: DISCONTINUED | OUTPATIENT
Start: 2018-03-02 | End: 2018-03-02 | Stop reason: HOSPADM

## 2018-03-01 RX ORDER — FINASTERIDE 5 MG/1
5 TABLET, FILM COATED ORAL DAILY
Status: DISCONTINUED | OUTPATIENT
Start: 2018-03-02 | End: 2018-03-02 | Stop reason: HOSPADM

## 2018-03-01 RX ORDER — SODIUM CHLORIDE 0.9 % (FLUSH) 0.9 %
1-10 SYRINGE (ML) INJECTION AS NEEDED
Status: DISCONTINUED | OUTPATIENT
Start: 2018-03-01 | End: 2018-03-02 | Stop reason: HOSPADM

## 2018-03-01 RX ORDER — NALOXONE HCL 0.4 MG/ML
0.4 VIAL (ML) INJECTION
Status: DISCONTINUED | OUTPATIENT
Start: 2018-03-01 | End: 2018-03-02 | Stop reason: HOSPADM

## 2018-03-01 RX ORDER — NITROGLYCERIN 0.4 MG/1
0.4 TABLET SUBLINGUAL
Status: DISCONTINUED | OUTPATIENT
Start: 2018-03-01 | End: 2018-03-02 | Stop reason: HOSPADM

## 2018-03-01 RX ADMIN — IOPAMIDOL 100 ML: 755 INJECTION, SOLUTION INTRAVENOUS at 17:51

## 2018-03-01 RX ADMIN — DESMOPRESSIN ACETATE 80 MG: 0.2 TABLET ORAL at 23:22

## 2018-03-01 RX ADMIN — DABIGATRAN ETEXILATE MESYLATE 75 MG: 75 CAPSULE ORAL at 23:21

## 2018-03-01 RX ADMIN — ENTACAPONE 200 MG: 200 TABLET, FILM COATED ORAL at 23:21

## 2018-03-01 RX ADMIN — SODIUM CHLORIDE 100 ML/HR: 9 INJECTION, SOLUTION INTRAVENOUS at 22:39

## 2018-03-01 RX ADMIN — CARBIDOPA AND LEVODOPA 1 TABLET: 25; 250 TABLET ORAL at 23:22

## 2018-03-01 NOTE — ED PROVIDER NOTES
Subjective   HPI Comments: Patient is a 89-year-old  male presents ER today as a transfer from [facility] with complaint of chest pain.  The patient reports that his pain began at approximately 1 PM today after he much with his wife.  Patient states that at 1:30 with a pain had resolved he took 2 nitroglycerin tablets.  He states that this did not help his pain.  He did took a Pepcid.  Again he states that this did not help with his pain.  At that time he decided to go to the ER for further evaluation.  Patient was seen at [facility].  Upon arrival he was given 324 mg of aspirin.  The patient had lab work drawn and was transferred to the ER for further evaluation.  At this time the patient's troponin is still pending and [facility] is will fax over the labs once completed.  The patient has a history of atrial fibrillation.  The is currently on Pradaxa.  Patient denies any shortness of breath, diaphoresis, nausea or vomiting with his chest pain.  He denies any back pain or abdominal pain.  The patient previously was admitted to [facility] in February 2017.  At that time he was admitted with chest pain.  At that time he had atrial fibrillation as well.  Patient had a echocardiogram performed which showed a mild aortic insufficiency, normal LV function with an ejection fraction of 60%, left atrial Aiden, mildly sclerotic aortic and mitral valve.  The patient reports that he has done well since that time until today.  The patient has a history of coronary artery disease and CABG in the past by Dr. Jeffers in 2002.  Patient is a nonsmoker.  Patient denies any chest pain at this time.  He denies any recent long-distance travel, he denies any leg pain or swelling, he denies any recent surgical interventions.  He presents to ER today for further evaluation.    Patient is a 89 y.o. male presenting with chest pain.   History provided by:  Patient   used: No    Chest Pain    Pain location:  Substernal area  Pain quality: aching and dull    Pain radiates to:  Does not radiate  Pain severity:  Mild  Onset quality:  Sudden  Duration:  2 hours  Timing:  Intermittent  Progression:  Resolved  Chronicity:  New  Context: not breathing, not drug use, not eating, not intercourse, not lifting, not movement, not raising an arm, not at rest and not trauma    Relieved by:  Nothing  Worsened by:  Nothing  Associated symptoms: no abdominal pain, no AICD problem, no altered mental status, no anorexia, no anxiety, no back pain, no claudication, no cough, no diaphoresis, no dizziness, no dysphagia, no fatigue, no fever, no headache, no heartburn, no lower extremity edema, no nausea, no near-syncope, no numbness, no orthopnea, no palpitations, no PND, no shortness of breath, no syncope, no vomiting and no weakness    Risk factors: no aortic disease, no birth control, no coronary artery disease, no diabetes mellitus, no Sage-Danlos syndrome, no high cholesterol, no hypertension, no immobilization, not male, no Marfan's syndrome, not obese, not pregnant, no prior DVT/PE, no smoking and no surgery        Review of Systems   Constitutional: Negative for diaphoresis, fatigue and fever.   HENT: Negative for trouble swallowing.    Respiratory: Negative for cough and shortness of breath.    Cardiovascular: Positive for chest pain. Negative for palpitations, orthopnea, claudication, syncope, PND and near-syncope.   Gastrointestinal: Negative for abdominal pain, anorexia, heartburn, nausea and vomiting.   Musculoskeletal: Negative for back pain.   Neurological: Negative for dizziness, weakness, numbness and headaches.   All other systems reviewed and are negative.      Past Medical History:   Diagnosis Date   • Atrial fibrillation    • BPH (benign prostatic hypertrophy) with urinary obstruction    • Elevated PSA    • Hypertension    • Parkinson disease        No Known Allergies    Past Surgical History:    Procedure Laterality Date   • CARDIAC CATHETERIZATION         Family History   Problem Relation Age of Onset   • Heart disease Father    • Cancer Mother        Social History     Social History   • Marital status:      Social History Main Topics   • Smoking status: Never Smoker   • Smokeless tobacco: Never Used   • Alcohol use Yes      Comment: occasionally   • Drug use: No           Objective   Physical Exam   Constitutional: He is oriented to person, place, and time. He appears well-developed and well-nourished.   HENT:   Head: Normocephalic and atraumatic.   Eyes: Conjunctivae are normal. Pupils are equal, round, and reactive to light.   Neck: Normal range of motion. Neck supple.   Cardiovascular: Normal rate, regular rhythm and normal heart sounds.    Pulmonary/Chest: Effort normal and breath sounds normal.   Abdominal: Soft. Bowel sounds are normal.   Neurological: He is alert and oriented to person, place, and time.   Skin: Skin is warm and dry.   Psychiatric: He has a normal mood and affect.   Nursing note and vitals reviewed.      Procedures         ED Course  ED Course   Comment By Time   Received records from [facility] ER; pt had an abnormal CXR; order for CTA chest placed. Mayda Freitas, APRN 03/01 1754   I did receive a chest x-ray from [facility].  This is a preliminary report.  It shows patchy opacities involving much of the right mid and lower lung which may represent a combination of atelectasis, pneumonia, and pleural fluid.  The possibility of underlying mass cannot be excluded.  A CT chest with contrast may be considered for further evaluation, mild cardiomegaly. Mayda Freitas, APRN 03/01 1849   The patient's labs showed a glucose of 138, creatinine of 1.3, V1 0.3, troponin less than 0.02.  CPK of 34, a white blood cell count of 7.3, hemoglobin and hematocrit of 11 and 34 respectively. Mayda Freitas, APRN 03/01 1850   The patient's INR here is 1.3.  He is on  Pradaxa. Troponin is 0.019. Mayda Freitas, APRN 03/01 1850   CTA chest shows a large rt pleural effusion. At this time call placed to Dr. Mitchell, hospitalist to discuss admission. Mayda Freitas, APRN 03/01 1921   Pt case discussed with Dr. Mitchell who agrees to admit the pt. The pt will be admitted in stable cond at this time. Pt advised of all lab/radiology/EKG findings at this time. Mayda Freitas, APRN 03/01 1954        CT Angiogram Chest With Contrast   Final Result   1. No evidence of embolic disease.           2. Large right pleural effusion associated compression atelectasis right   lower lobe.           This report was finalized on 03/01/2018 18:44 by Dr. Link Fernandez MD.        Lab Results (last 24 hours)     Procedure Component Value Units Date/Time    Troponin [[id number]]  (Normal) Collected:  03/01/18 1555    Specimen:  Blood Updated:  03/01/18 1635     Troponin I 0.019 ng/mL     Protime-INR [[id number]]  (Abnormal) Collected:  03/01/18 1609    Specimen:  Blood Updated:  03/01/18 1625     Protime 17.2 (H) Seconds      INR 1.36 (H)    aPTT [[id number]]  (Abnormal) Collected:  03/01/18 1609    Specimen:  Blood Updated:  03/01/18 1625     PTT 48.7 (H) seconds     D-dimer, Quantitative [[id number]]  (Normal) Collected:  03/01/18 1609    Specimen:  Blood Updated:  03/01/18 1627     D-Dimer, Quantitative 0.39 mg/L (FEU)     Narrative:       Reference Range is 0-0.50 mg/L FEU. However, results <0.50 mg/L FEU tends to rule out DVT or PE. Results >0.50 mg/L FEU are not useful in predicting absence or presence of DVT or PE.                HEART Score (for prediction of 6-week risk of major adverse cardiac event) reviewed and/or performed as part of the patient evaluation and treatment planning process.  The result associated with this review/performance is: 4       MDM  Number of Diagnoses or Management Options  Chest pain, unspecified type: new and requires workup  Pleural effusion: new and requires  workup     Amount and/or Complexity of Data Reviewed  Clinical lab tests: ordered and reviewed  Tests in the radiology section of CPT®: ordered and reviewed  Tests in the medicine section of CPT®: ordered and reviewed  Decide to obtain previous medical records or to obtain history from someone other than the patient: yes  Discuss the patient with other providers: yes    Patient Progress  Patient progress: stable      Final diagnoses:   Chest pain, unspecified type   Pleural effusion            Mayda Freitas, APRN  03/01/18 1956

## 2018-03-02 ENCOUNTER — APPOINTMENT (OUTPATIENT)
Dept: GENERAL RADIOLOGY | Facility: HOSPITAL | Age: 83
End: 2018-03-02

## 2018-03-02 VITALS
TEMPERATURE: 97.2 F | WEIGHT: 221.13 LBS | BODY MASS INDEX: 30.96 KG/M2 | OXYGEN SATURATION: 97 % | HEIGHT: 71 IN | DIASTOLIC BLOOD PRESSURE: 96 MMHG | RESPIRATION RATE: 16 BRPM | HEART RATE: 74 BPM | SYSTOLIC BLOOD PRESSURE: 139 MMHG

## 2018-03-02 LAB
ALBUMIN SERPL-MCNC: 3.3 G/DL (ref 3.5–5)
ALBUMIN/GLOB SERPL: 1.1 G/DL (ref 1.1–2.5)
ALP SERPL-CCNC: 58 U/L (ref 24–120)
ALT SERPL W P-5'-P-CCNC: <15 U/L (ref 0–54)
ANION GAP SERPL CALCULATED.3IONS-SCNC: 9 MMOL/L (ref 4–13)
APTT PPP: 53.6 SECONDS (ref 24.1–34.8)
ARTICHOKE IGE QN: 77 MG/DL (ref 0–99)
AST SERPL-CCNC: 14 U/L (ref 7–45)
BASOPHILS # BLD AUTO: 0.03 10*3/MM3 (ref 0–0.2)
BASOPHILS NFR BLD AUTO: 0.4 % (ref 0–2)
BILIRUB SERPL-MCNC: 0.6 MG/DL (ref 0.1–1)
BUN BLD-MCNC: 17 MG/DL (ref 5–21)
BUN/CREAT SERPL: 15.5 (ref 7–25)
CALCIUM SPEC-SCNC: 8.8 MG/DL (ref 8.4–10.4)
CHLORIDE SERPL-SCNC: 104 MMOL/L (ref 98–110)
CHOLEST SERPL-MCNC: 112 MG/DL (ref 130–200)
CK SERPL-CCNC: 34 U/L (ref 0–203)
CO2 SERPL-SCNC: 30 MMOL/L (ref 24–31)
CREAT BLD-MCNC: 1.1 MG/DL (ref 0.5–1.4)
DEPRECATED RDW RBC AUTO: 43.8 FL (ref 40–54)
EOSINOPHIL # BLD AUTO: 0.06 10*3/MM3 (ref 0–0.7)
EOSINOPHIL NFR BLD AUTO: 0.9 % (ref 0–4)
ERYTHROCYTE [DISTWIDTH] IN BLOOD BY AUTOMATED COUNT: 15.9 % (ref 12–15)
GFR SERPL CREATININE-BSD FRML MDRD: 63 ML/MIN/1.73
GLOBULIN UR ELPH-MCNC: 2.9 GM/DL
GLUCOSE BLD-MCNC: 98 MG/DL (ref 70–100)
HBA1C MFR BLD: 5.2 %
HCT VFR BLD AUTO: 32.6 % (ref 40–52)
HDLC SERPL-MCNC: 26 MG/DL
HGB BLD-MCNC: 10.5 G/DL (ref 14–18)
IMM GRANULOCYTES # BLD: 0.03 10*3/MM3 (ref 0–0.03)
IMM GRANULOCYTES NFR BLD: 0.4 % (ref 0–5)
INR PPP: 1.35 (ref 0.91–1.09)
LDLC/HDLC SERPL: 2.92 {RATIO}
LYMPHOCYTES # BLD AUTO: 1.19 10*3/MM3 (ref 0.72–4.86)
LYMPHOCYTES NFR BLD AUTO: 16.9 % (ref 15–45)
MAGNESIUM SERPL-MCNC: 2.1 MG/DL (ref 1.4–2.2)
MCH RBC QN AUTO: 24.9 PG (ref 28–32)
MCHC RBC AUTO-ENTMCNC: 32.2 G/DL (ref 33–36)
MCV RBC AUTO: 77.3 FL (ref 82–95)
MONOCYTES # BLD AUTO: 0.39 10*3/MM3 (ref 0.19–1.3)
MONOCYTES NFR BLD AUTO: 5.5 % (ref 4–12)
NEUTROPHILS # BLD AUTO: 5.35 10*3/MM3 (ref 1.87–8.4)
NEUTROPHILS NFR BLD AUTO: 75.9 % (ref 39–78)
NRBC BLD MANUAL-RTO: 0 /100 WBC (ref 0–0)
PHOSPHATE SERPL-MCNC: 4 MG/DL (ref 2.5–4.5)
PLATELET # BLD AUTO: 187 10*3/MM3 (ref 130–400)
PMV BLD AUTO: 10.7 FL (ref 6–12)
POTASSIUM BLD-SCNC: 4.4 MMOL/L (ref 3.5–5.3)
PROT SERPL-MCNC: 6.2 G/DL (ref 6.3–8.7)
PROTHROMBIN TIME: 17.1 SECONDS (ref 11.9–14.6)
RBC # BLD AUTO: 4.22 10*6/MM3 (ref 4.8–5.9)
SODIUM BLD-SCNC: 143 MMOL/L (ref 135–145)
TRIGL SERPL-MCNC: 50 MG/DL (ref 0–149)
TROPONIN I SERPL-MCNC: 0.73 NG/ML (ref 0–0.03)
TROPONIN I SERPL-MCNC: 0.79 NG/ML (ref 0–0.03)
WBC NRBC COR # BLD: 7.05 10*3/MM3 (ref 4.8–10.8)

## 2018-03-02 PROCEDURE — 82550 ASSAY OF CK (CPK): CPT | Performed by: FAMILY MEDICINE

## 2018-03-02 PROCEDURE — 96374 THER/PROPH/DIAG INJ IV PUSH: CPT

## 2018-03-02 PROCEDURE — 80053 COMPREHEN METABOLIC PANEL: CPT | Performed by: FAMILY MEDICINE

## 2018-03-02 PROCEDURE — 84100 ASSAY OF PHOSPHORUS: CPT | Performed by: FAMILY MEDICINE

## 2018-03-02 PROCEDURE — 85610 PROTHROMBIN TIME: CPT | Performed by: FAMILY MEDICINE

## 2018-03-02 PROCEDURE — 71045 X-RAY EXAM CHEST 1 VIEW: CPT

## 2018-03-02 PROCEDURE — 85730 THROMBOPLASTIN TIME PARTIAL: CPT | Performed by: FAMILY MEDICINE

## 2018-03-02 PROCEDURE — 99204 OFFICE O/P NEW MOD 45 MIN: CPT | Performed by: INTERNAL MEDICINE

## 2018-03-02 PROCEDURE — 85025 COMPLETE CBC W/AUTO DIFF WBC: CPT | Performed by: FAMILY MEDICINE

## 2018-03-02 PROCEDURE — G0378 HOSPITAL OBSERVATION PER HR: HCPCS

## 2018-03-02 PROCEDURE — 83735 ASSAY OF MAGNESIUM: CPT | Performed by: FAMILY MEDICINE

## 2018-03-02 PROCEDURE — 96361 HYDRATE IV INFUSION ADD-ON: CPT

## 2018-03-02 PROCEDURE — 25010000002 HYDRALAZINE PER 20 MG: Performed by: FAMILY MEDICINE

## 2018-03-02 PROCEDURE — 80061 LIPID PANEL: CPT | Performed by: PHYSICIAN ASSISTANT

## 2018-03-02 PROCEDURE — 84484 ASSAY OF TROPONIN QUANT: CPT | Performed by: FAMILY MEDICINE

## 2018-03-02 PROCEDURE — 83036 HEMOGLOBIN GLYCOSYLATED A1C: CPT | Performed by: PHYSICIAN ASSISTANT

## 2018-03-02 RX ORDER — DOCUSATE SODIUM 100 MG/1
100 CAPSULE, LIQUID FILLED ORAL 2 TIMES DAILY
Status: DISCONTINUED | OUTPATIENT
Start: 2018-03-02 | End: 2018-03-02 | Stop reason: HOSPADM

## 2018-03-02 RX ORDER — HYDRALAZINE HYDROCHLORIDE 20 MG/ML
10 INJECTION INTRAMUSCULAR; INTRAVENOUS ONCE
Status: COMPLETED | OUTPATIENT
Start: 2018-03-02 | End: 2018-03-02

## 2018-03-02 RX ORDER — ASPIRIN 81 MG/1
81 TABLET ORAL DAILY
Status: DISCONTINUED | OUTPATIENT
Start: 2018-03-02 | End: 2018-03-02 | Stop reason: HOSPADM

## 2018-03-02 RX ORDER — ASPIRIN 81 MG/1
81 TABLET ORAL DAILY
Start: 2018-03-03

## 2018-03-02 RX ADMIN — DABIGATRAN ETEXILATE MESYLATE 75 MG: 75 CAPSULE ORAL at 09:15

## 2018-03-02 RX ADMIN — DOCUSATE SODIUM 100 MG: 100 CAPSULE ORAL at 10:32

## 2018-03-02 RX ADMIN — FINASTERIDE 5 MG: 5 TABLET, FILM COATED ORAL at 09:15

## 2018-03-02 RX ADMIN — ASPIRIN 81 MG: 81 TABLET ORAL at 10:32

## 2018-03-02 RX ADMIN — ENTACAPONE 200 MG: 200 TABLET, FILM COATED ORAL at 09:15

## 2018-03-02 RX ADMIN — SODIUM CHLORIDE 100 ML/HR: 9 INJECTION, SOLUTION INTRAVENOUS at 09:17

## 2018-03-02 RX ADMIN — Medication 10 MG: at 04:30

## 2018-03-02 RX ADMIN — LEVOTHYROXINE SODIUM 75 MCG: 75 TABLET ORAL at 09:15

## 2018-03-02 RX ADMIN — LISINOPRIL 2.5 MG: 2.5 TABLET ORAL at 09:15

## 2018-03-02 NOTE — H&P
[facility] Medicine Services  HISTORY AND PHYSICAL    Date of Admission: 3/1/2018  Primary Care Physician: Estefania Rodriguez MD    Subjective     Chief Complaint: Chest pain    History of Present Illness       89-year-old male with past medical history of atrial fibrillation, BPH, hypertension and Parkinson's disease was brought into the ER from [facility] for chest pain.  Patient states he was having lunch when he started having left-sided chest discomfort.  He denies any nausea or vomiting associated with chest pain.  No diaphoresis.  Patient was taken to [facility].  Patient received nitroglycerin.  Initial troponin was noted to be negative.  Later patient was transferred here for further cardiology evaluation.  Patient had a CTA in the ER which was noted for no evidence of embolic disease.  Large right pleural effusion associated compression atelectasis right lower lobe.  During evaluation bedside patient states his chest pain has resolved.  Repeat troponin was noted to be mildly elevated.  Patient will be admitted for further cardiology evaluation.        Review of Systems     Otherwise complete ROS reviewed and negative except as mentioned in the HPI.    Past Medical History:   Past Medical History:   Diagnosis Date   • Atrial fibrillation    • BPH (benign prostatic hypertrophy) with urinary obstruction    • Elevated PSA    • Hypertension    • Parkinson disease      Past Surgical History:  Past Surgical History:   Procedure Laterality Date   • CARDIAC CATHETERIZATION       Social History:  reports that he has never smoked. He has never used smokeless tobacco. He reports that he drinks alcohol. He reports that he does not use illicit drugs.    Family History: family history includes Cancer in his mother; Heart disease in his father.       Allergies:  No Known Allergies  Medications:  Prior to Admission medications    Medication Sig Start Date  "End Date Taking? Authorizing Provider   carbidopa-levodopa (SINEMET)  MG per tablet Take  by mouth. 6/22/16   Historical Provider, MD   dabigatran etexilate (PRADAXA) 75 MG capsule Take 75 mg by mouth 2 (Two) Times a Day.    Historical Provider, MD   entacapone (COMTAN) 200 MG tablet Take  by mouth. 6/22/16   Historical Provider, MD   finasteride (PROSCAR) 5 MG tablet TAKE ONE TABLET BY MOUTH ONCE DAILY 2/5/18   Pradeep Deluna MD   levothyroxine (SYNTHROID, LEVOTHROID) 75 MCG tablet Take 75 mcg by mouth Daily.    Historical Provider, MD   lisinopril (PRINIVIL,ZESTRIL) 2.5 MG tablet Take 2.5 mg by mouth Daily.    Historical Provider, MD   SIMVASTATIN PO Take  by mouth.    Historical Provider, MD   VALSARTAN PO Take  by mouth.    Historical Provider, MD     Objective     Vital Signs: /98  Pulse 70  Temp 98.5 °F (36.9 °C)  Resp 16  Ht 180.3 cm (71\")  Wt 102 kg (225 lb)  SpO2 100%  BMI 31.38 kg/m2       Physical Exam   Constitutional: He is oriented to person, place, and time. He appears well-developed and well-nourished.   HENT:   Head: Normocephalic and atraumatic.   Eyes: EOM are normal. Pupils are equal, round, and reactive to light.   Neck: Normal range of motion. Neck supple.   Cardiovascular: Normal rate, regular rhythm and normal heart sounds.    Pulmonary/Chest: Effort normal and breath sounds normal.   Abdominal: Soft. Bowel sounds are normal.   Musculoskeletal: Normal range of motion.   Neurological: He is alert and oriented to person, place, and time.   Skin: Skin is warm.   Psychiatric: He has a normal mood and affect. His behavior is normal. Thought content normal.             Results Reviewed:  Lab Results (last 24 hours)     Procedure Component Value Units Date/Time    Protime-INR [[id number]]  (Abnormal) Collected:  03/01/18 1609    Specimen:  Blood Updated:  03/01/18 1625     Protime 17.2 (H) Seconds      INR 1.36 (H)    aPTT [[id number]]  (Abnormal) Collected:  03/01/18 1609    " Specimen:  Blood Updated:  03/01/18 1625     PTT 48.7 (H) seconds     D-dimer, Quantitative [[id number]]  (Normal) Collected:  03/01/18 1609    Specimen:  Blood Updated:  03/01/18 1627     D-Dimer, Quantitative 0.39 mg/L (FEU)     Narrative:       Reference Range is 0-0.50 mg/L FEU. However, results <0.50 mg/L FEU tends to rule out DVT or PE. Results >0.50 mg/L FEU are not useful in predicting absence or presence of DVT or PE.    Troponin [[id number]]  (Normal) Collected:  03/01/18 1555    Specimen:  Blood Updated:  03/01/18 1635     Troponin I 0.019 ng/mL     Troponin [[id number]] Collected:  03/01/18 1954    Specimen:  Blood Updated:  03/01/18 1959        Imaging Results (last 24 hours)     Procedure Component Value Units Date/Time    CT Angiogram Chest With Contrast [[id number]] Collected:  03/01/18 1841     Updated:  03/01/18 1847    Narrative:       CT ANGIOGRAM CHEST W CONTRAST- 3/1/2018 5:48 PM CST      HISTORY: chest pain, abnormal CXR at [facility] ER; Cr 1.3, BUN 12.3,  GFR 52 per labs from [facility] ER      COMPARISON: None.      DOSE LENGTH PRODUCT: 674 mGy cm. Automated exposure control was also  utilized to decrease patient radiation dose.     TECHNIQUE: Helical tomographic images of the chest were obtained after  the administration of intravenous contrast following angiogram protocol.  Additionally, 3D and multiplanar reformatted images were provided.        FINDINGS:    Pulmonary arteries: There is adequate enhancement of the pulmonary  arteries to evaluate for central and segmental pulmonary emboli. There  are no filling defects within the main, lobar, segmental or visualized  subsegmental pulmonary arteries.    .      Aorta and great vessels: There is atherosclerosis in the aorta without  aneurysm or dissection. There is atherosclerosis in the great vessels.     Visualized neck base: The imaged portion of the base of the neck appears  unremarkable.      Lungs: The left lung is clear. A large right  pleural effusion is  present. This causes compression atelectasis of the right lower lobe..  The trachea and bronchial tree are patent.      Heart: The heart is normal in size. Extensive vascular calcifications  present in the coronary arteries..      Mediastinum and lymph nodes: No enlarged mediastinal, hilar, or axillary  lymph nodes are present.      Skeletal and soft tissues: The osseous structures of the thorax and  surrounding soft tissues demonstrate no acute process. Moderate  hypertrophic degenerative changes noted. Wires are present previous  median sternotomy.     Upper abdomen: Vascular cavitations noted in the abdominal aorta and  celiac and mesenteric arteries. Left renal stent is present..        Impression:       1. No evidence of embolic disease.        2. Large right pleural effusion associated compression atelectasis right  lower lobe.        This report was finalized on 03/01/2018 18:44 by Dr. Link Fernandez MD.        I have personally reviewed and interpreted the radiology studies and ECG obtained at time of admission.     Assessment / Plan     Assessment:   Hospital Problem List     Chest pain        1.  Chest pain rule out MI  2.  Elevated troponin  3.  Atrial fibrillation  4.  Parkinson's disease  5.  Hypothyroidism  6.  Hypertension    Plan:      -Admit for further evaluation  -Continue cardiac monitoring next line-continuous pulse ox  -Initial EKG noted   -Follow-up a.m. EKG  -Repeat troponin noted to be mildly elevated  -Follow-up subsequent troponin level  -Chest pain resolved  -Pain management  -Maintain optimal blood pressure  -Follow-up cardiology consult  -Follow-up echocardiogram  -Continue  Pradaxa  -Continue home medications  -GI prophylaxis  -DVT prophylaxis          Code Status: Full code     I discussed the patients findings and my recommendations with the patient's RN    Estimated length of stay 2-3 days    Yasmine Mitchell MD   03/01/18   8:24 PM

## 2018-03-02 NOTE — PLAN OF CARE
Problem: Patient Care Overview (Adult)  Goal: Plan of Care Review  Outcome: Ongoing (interventions implemented as appropriate)   03/02/18 0246   Coping/Psychosocial Response Interventions   Plan Of Care Reviewed With patient   Patient Care Overview   Progress no change   Outcome Evaluation   Outcome Summary/Follow up Plan CTA showed larger R pleural effusion; troponin elevated to .320; O2/2 per n/c; rhythm is afib heart rate is 66 per monitor room; NPO; history of Htn,afib, BPH,and Parkinsons; arrived to ER from [facility] with chest pain        Problem: Pain, Acute (Adult)  Goal: Identify Related Risk Factors and Signs and Symptoms  Outcome: Ongoing (interventions implemented as appropriate)   03/02/18 0246   Pain, Acute   Related Risk Factors (Acute Pain) disease process   Signs and Symptoms (Acute Pain) verbalization of pain descriptors     Goal: Acceptable Pain Control/Comfort Level  Outcome: Ongoing (interventions implemented as appropriate)   03/02/18 0246   Pain, Acute (Adult)   Acceptable Pain Control/Comfort Level making progress toward outcome

## 2018-03-02 NOTE — CONSULTS
"[facility] HEART GROUP CONSULT NOTE    Referring Provider: Yasmine Mitchell MD    Reason for Consultation: chest pain, elevated troponin     Chief Complaint   Patient presents with   • Chest Pain   • [facility] Transfer       Subjective .     History of present illness:  Javier Whitfield is a 89 y.o. male with history of coronary artery disease s/p CABG, HTN, HLD, Parkinson disease, chronic Afib on chronic anticoagulation with Pradaxa, prostate cancer who presented to [facility] ED as a transfer from [facility] yesterday with complaints of chest pain. The patient notes that he had just eaten yesterday, went to sit down to watch tv and begin having chest pain while watching tv. He describes this as \"all across his chest\", a dull pain, at worst 5-6/10. He states he took NTG x 2 without relief. TUMS also didn't relieve. He notes spontaneous resolution during transportation here. He denies any recurrence of pain since. The patient denies any associated symptom or radiation of pain. He does report 4 occurrences of chest pain with exertion during recent weeks. He notes resolution of this pain without minutes with rest. He denies any exertional dyspnea, orthopnea, paroxysmal nocturnal dyspnea, leg swelling or similar. He states he continues to feel well today. The patient notes Dr. Andrew took him off of aspirin, plavix a month or so ago and placed him on Pradaxa. Mr. Whitfield tells me he would like to opt for medical management due to his advanced age. He states \"I'm almost 89 and that's pretty good to me. I don't think I want to go through something invasive.\" He and his wife are both of this mindset regarding his care.     History  Past Medical History:   Diagnosis Date   • Atrial fibrillation    • BPH (benign prostatic hypertrophy) with urinary obstruction    • Elevated PSA    • Hypertension    • Parkinson disease    ,   Past Surgical History:   Procedure Laterality Date   • CARDIAC CATHETERIZATION     ,   Family History   Problem " Relation Age of Onset   • Heart disease Father    • Cancer Mother    ,   Social History   Substance Use Topics   • Smoking status: Never Smoker   • Smokeless tobacco: Never Used   • Alcohol use Yes      Comment: occasionally   ,     Medications  Current Facility-Administered Medications   Medication Dose Route Frequency Provider Last Rate Last Dose   • atorvastatin (LIPITOR) tablet 80 mg  80 mg Oral Nightly Yasmine Mitchell MD   80 mg at 03/01/18 2322   • carbidopa-levodopa (SINEMET)  MG per tablet 1 tablet  1 tablet Oral Every Other Day Yasmine Mitchell MD   1 tablet at 03/01/18 2322   • dabigatran etexilate (PRADAXA) capsule 75 mg  75 mg Oral Q12H Yasmine Mitchell MD   75 mg at 03/02/18 0915   • entacapone (COMTAN) tablet 200 mg  200 mg Oral Q12H Yasmine Mitchell MD   200 mg at 03/02/18 0915   • finasteride (PROSCAR) tablet 5 mg  5 mg Oral Daily Yasmine Mitchell MD   5 mg at 03/02/18 0915   • levothyroxine (SYNTHROID, LEVOTHROID) tablet 75 mcg  75 mcg Oral Daily Yasmine Mitchell MD   75 mcg at 03/02/18 0915   • lisinopril (PRINIVIL,ZESTRIL) tablet 2.5 mg  2.5 mg Oral Daily Yasmine Mitchell MD   2.5 mg at 03/02/18 0915   • Morphine sulfate (PF) injection 1 mg  1 mg Intravenous Q4H PRN Yasmine Mitchell MD        And   • naloxone (NARCAN) injection 0.4 mg  0.4 mg Intravenous Q5 Min PRN Yasmine Mitchell MD       • nitroglycerin (NITROSTAT) SL tablet 0.4 mg  0.4 mg Sublingual Q5 Min PRN Yasmine Mitchell MD       • sodium chloride 0.9 % flush 1-10 mL  1-10 mL Intravenous PRN Yasmine Mitchell MD       • sodium chloride 0.9 % infusion  100 mL/hr Intravenous Continuous Yasmine Mitchell  mL/hr at 03/02/18 0917 100 mL/hr at 03/02/18 0917       Allergies:  Review of patient's allergies indicates no known allergies.    Review of Systems  Review of Systems   Constitution: Negative for malaise/fatigue and weight gain.   Cardiovascular: Positive for chest pain. Negative for claudication, dyspnea on  "exertion, irregular heartbeat, leg swelling, near-syncope, orthopnea, palpitations, paroxysmal nocturnal dyspnea and syncope.   Respiratory: Negative for hemoptysis and shortness of breath.    Hematologic/Lymphatic: Negative for bleeding problem.   Skin: Negative for poor wound healing.   Musculoskeletal: Negative for myalgias.   Gastrointestinal: Negative for melena, nausea and vomiting.   Genitourinary: Negative for hematuria.   Neurological: Positive for dizziness and tremors. Negative for focal weakness.   Psychiatric/Behavioral: Negative for memory loss.   All other systems reviewed and are negative.      Objective     Physical Exam:  Patient Vitals for the past 24 hrs:   BP Temp Temp src Pulse Resp SpO2 Height Weight   03/02/18 0757 154/62 97.4 °F (36.3 °C) Temporal Art 67 18 96 % - -   03/02/18 0616 142/62 - - - - - - -   03/02/18 0415 179/66 96.4 °F (35.8 °C) Temporal Art 65 18 97 % - -   03/01/18 2333 178/77 97 °F (36.1 °C) Temporal Art 67 18 100 % - -   03/01/18 2128 156/64 97.6 °F (36.4 °C) Temporal Art 61 18 99 % 180.3 cm (71\") 100 kg (221 lb 2 oz)   03/01/18 2045 156/92 98.5 °F (36.9 °C) - 84 16 96 % - -   03/01/18 2031 156/92 - - 84 - 96 % - -   03/01/18 1954 157/98 - - 70 16 100 % - -   03/01/18 1940 (!) 189/79 98.5 °F (36.9 °C) - 71 18 97 % - -   03/01/18 1939 - 98.5 °F (36.9 °C) - 77 18 98 % - -   03/01/18 1900 - - - 77 - 95 % - -   03/01/18 1830 - - - 76 - (!) 84 % - -   03/01/18 1804 168/78 - - 70 - 99 % - -   03/01/18 1732 167/77 - - 69 - 94 % - -   03/01/18 1717 - - - 64 - 95 % - -   03/01/18 1716 158/77 - - - - - - -   03/01/18 1701 156/88 - - 80 - 95 % - -   03/01/18 1648 - - - 75 - 98 % - -   03/01/18 1646 179/90 - - - - - - -   03/01/18 1632 156/80 - - - - - - -   03/01/18 1629 - - - 81 - 96 % - -   03/01/18 1617 166/75 - - - - - - -   03/01/18 1610 - 97.8 °F (36.6 °C) Temporal Art - - - - -   03/01/18 1546 145/81 - - 78 - 96 % - -   03/01/18 1537 165/83 - - 76 22 96 % 180.3 cm (71\") 102 kg " (225 lb)     Physical Exam   Constitutional: He is oriented to person, place, and time. He appears well-developed and well-nourished.   HENT:   Head: Normocephalic and atraumatic.   Eyes: Conjunctivae and EOM are normal. Pupils are equal, round, and reactive to light.   Neck: Normal range of motion. Neck supple. No JVD present.   Cardiovascular: Normal rate, regular rhythm, S1 normal, S2 normal, normal heart sounds, intact distal pulses and normal pulses.    No murmur heard.  Pulmonary/Chest: Effort normal. No respiratory distress. He has decreased breath sounds in the right middle field and the right lower field.   Abdominal: Soft. Bowel sounds are normal. He exhibits no distension.   Musculoskeletal: He exhibits no edema.   Neurological: He is alert and oriented to person, place, and time. He displays tremor.   Skin: Skin is warm and dry.   Psychiatric: He has a normal mood and affect. Judgment normal.   Vitals reviewed.      Results Review:   I reviewed the patient's new clinical results.    Lab Results (last 72 hours)     Procedure Component Value Units Date/Time    Protime-INR [[id number]]  (Abnormal) Collected:  03/01/18 1609    Specimen:  Blood Updated:  03/01/18 1625     Protime 17.2 (H) Seconds      INR 1.36 (H)    aPTT [[id number]]  (Abnormal) Collected:  03/01/18 1609    Specimen:  Blood Updated:  03/01/18 1625     PTT 48.7 (H) seconds     D-dimer, Quantitative [[id number]]  (Normal) Collected:  03/01/18 1609    Specimen:  Blood Updated:  03/01/18 1627     D-Dimer, Quantitative 0.39 mg/L (FEU)     Narrative:       Reference Range is 0-0.50 mg/L FEU. However, results <0.50 mg/L FEU tends to rule out DVT or PE. Results >0.50 mg/L FEU are not useful in predicting absence or presence of DVT or PE.    Troponin [[id number]]  (Normal) Collected:  03/01/18 1555    Specimen:  Blood Updated:  03/01/18 1635     Troponin I 0.019 ng/mL     Troponin [[id number]]  (Abnormal) Collected:  03/01/18 1954    Specimen:  Blood  Updated:  03/01/18 2033     Troponin I 0.320 (H) ng/mL     Urinalysis With / Culture If Indicated - Urine, Clean Catch [[id number]]  (Abnormal) Collected:  03/01/18 2254    Specimen:  Urine from Urine, Clean Catch Updated:  03/01/18 2307     Color, UA Yellow     Appearance, UA Clear     pH, UA <=5.0     Specific Gravity, UA >1.030 (H)     Glucose, UA Negative     Ketones, UA Negative     Bilirubin, UA Negative     Blood, UA Negative     Protein, UA Negative     Leuk Esterase, UA Negative     Nitrite, UA Negative     Urobilinogen, UA 0.2 E.U./dL    Narrative:       Urine microscopic not indicated.    CBC Auto Differential [[id number]]  (Abnormal) Collected:  03/02/18 0118    Specimen:  Blood Updated:  03/02/18 0225     WBC 7.05 10*3/mm3      RBC 4.22 (L) 10*6/mm3      Hemoglobin 10.5 (L) g/dL      Hematocrit 32.6 (L) %      MCV 77.3 (L) fL      MCH 24.9 (L) pg      MCHC 32.2 (L) g/dL      RDW 15.9 (H) %      RDW-SD 43.8 fl      MPV 10.7 fL      Platelets 187 10*3/mm3      Neutrophil % 75.9 %      Lymphocyte % 16.9 %      Monocyte % 5.5 %      Eosinophil % 0.9 %      Basophil % 0.4 %      Immature Grans % 0.4 %      Neutrophils, Absolute 5.35 10*3/mm3      Lymphocytes, Absolute 1.19 10*3/mm3      Monocytes, Absolute 0.39 10*3/mm3      Eosinophils, Absolute 0.06 10*3/mm3      Basophils, Absolute 0.03 10*3/mm3      Immature Grans, Absolute 0.03 10*3/mm3      nRBC 0.0 /100 WBC     Protime-INR [[id number]]  (Abnormal) Collected:  03/02/18 0118    Specimen:  Blood Updated:  03/02/18 0233     Protime 17.1 (H) Seconds      INR 1.35 (H)    aPTT [[id number]]  (Abnormal) Collected:  03/02/18 0118    Specimen:  Blood Updated:  03/02/18 0233     PTT 53.6 (H) seconds     Magnesium [[id number]]  (Normal) Collected:  03/02/18 0118    Specimen:  Blood Updated:  03/02/18 0240     Magnesium 2.1 mg/dL     Phosphorus [[id number]]  (Normal) Collected:  03/02/18 0118    Specimen:  Blood Updated:  03/02/18 0240     Phosphorus 4.0 mg/dL      Comprehensive Metabolic Panel [[id number]]  (Abnormal) Collected:  03/02/18 0118    Specimen:  Blood Updated:  03/02/18 0240     Glucose 98 mg/dL      BUN 17 mg/dL      Creatinine 1.10 mg/dL      Sodium 143 mmol/L      Potassium 4.4 mmol/L      Chloride 104 mmol/L      CO2 30.0 mmol/L      Calcium 8.8 mg/dL      Total Protein 6.2 (L) g/dL      Albumin 3.30 (L) g/dL      ALT (SGPT) <15 U/L      AST (SGOT) 14 U/L      Alkaline Phosphatase 58 U/L      Total Bilirubin 0.6 mg/dL      eGFR Non African Amer 63 mL/min/1.73      Globulin 2.9 gm/dL      A/G Ratio 1.1 g/dL      BUN/Creatinine Ratio 15.5     Anion Gap 9.0 mmol/L     Narrative:       The MDRD GFR formula is only valid for adults with stable renal function between ages 18 and 70.    CK [[id number]]  (Normal) Collected:  03/02/18 0118    Specimen:  Blood Updated:  03/02/18 0240     Creatine Kinase 34 U/L     Troponin [[id number]]  (Abnormal) Collected:  03/02/18 0118    Specimen:  Blood Updated:  03/02/18 0255     Troponin I 0.790 (C) ng/mL     Troponin [[id number]]  (Abnormal) Collected:  03/02/18 0519    Specimen:  Blood Updated:  03/02/18 0638     Troponin I 0.735 (C) ng/mL           No results found for: ECHOEFEST    Imaging Results (last 72 hours)     Procedure Component Value Units Date/Time    CT Angiogram Chest With Contrast [[id number]] Collected:  03/01/18 1841     Updated:  03/01/18 1847    Narrative:       CT ANGIOGRAM CHEST W CONTRAST- 3/1/2018 5:48 PM CST      HISTORY: chest pain, abnormal CXR at [facility] ER; Cr 1.3, BUN 12.3,  GFR 52 per labs from [facility] ER      COMPARISON: None.      DOSE LENGTH PRODUCT: 674 mGy cm. Automated exposure control was also  utilized to decrease patient radiation dose.     TECHNIQUE: Helical tomographic images of the chest were obtained after  the administration of intravenous contrast following angiogram protocol.  Additionally, 3D and multiplanar reformatted images were provided.        FINDINGS:    Pulmonary  arteries: There is adequate enhancement of the pulmonary  arteries to evaluate for central and segmental pulmonary emboli. There  are no filling defects within the main, lobar, segmental or visualized  subsegmental pulmonary arteries.    .      Aorta and great vessels: There is atherosclerosis in the aorta without  aneurysm or dissection. There is atherosclerosis in the great vessels.     Visualized neck base: The imaged portion of the base of the neck appears  unremarkable.      Lungs: The left lung is clear. A large right pleural effusion is  present. This causes compression atelectasis of the right lower lobe..  The trachea and bronchial tree are patent.      Heart: The heart is normal in size. Extensive vascular calcifications  present in the coronary arteries..      Mediastinum and lymph nodes: No enlarged mediastinal, hilar, or axillary  lymph nodes are present.      Skeletal and soft tissues: The osseous structures of the thorax and  surrounding soft tissues demonstrate no acute process. Moderate  hypertrophic degenerative changes noted. Wires are present previous  median sternotomy.     Upper abdomen: Vascular cavitations noted in the abdominal aorta and  celiac and mesenteric arteries. Left renal stent is present..        Impression:       1. No evidence of embolic disease.        2. Large right pleural effusion associated compression atelectasis right  lower lobe.        This report was finalized on 03/01/2018 18:44 by Dr. Link Fernandez MD.    XR Chest 1 View [[id number]] Collected:  03/02/18 0819     Updated:  03/02/18 0824    Narrative:       History:  89-year-old evaluated for chest pain.     Reference:  CTA chest 1 day prior.     Findings:  Frontal chest radiograph performed.     Cardiomegaly. Prior median sternotomy. Left lung is clear. Compared to  yesterday's CT, there remains a large right pleural effusion causing  compressive atelectasis. No pneumothorax. Atherosclerotic thoracic  aorta.           Impression:       Persistent large right pleural effusion.  This report was finalized on 03/02/2018 08:21 by  Issa Milner, .        Assessment   1. Chest pain  2. Non ST elevation myocardial infarction : troponin trending down, patient asymptomatic now  3. Large right pleural effusion: pt asymptomatic   4. Hypertension  5. Chronic atrial fibrillation: anticoagulated on Pradaxa  6. Hypothyroidism  7. Hyperlipidemia  8. Parkinson disease  9. History of prostate cancer  10. Coronary artery disease: status-post coronary artery bypass grafting in 2002      Plan   1. Will begin patient on 81 mg aspirin given coronary artery disease and NSTEMI.   2. Lipid panel and HgA1C for further risk stratification  3. Echo. Patient wants to opt for medical management given his advanced age and multiple comorbidities  4. As patient is asymptomatic with large right pleural effusion, could likely just monitor this with repeat CXR in future or as needed if symptoms appear  5. Discontinue IVF. Continue other present therapy  6. From the patient's history, it appears he has an extensive history of orthostatic hypotension and remote history of syncope. I suspect with his age, Parkinson and associated medications for this, we will have to accept a somewhat higher baseline blood pressure in this particular patient. Patient can follow-up with Dr. Andrew in [place] and his PCP upon discharge for further recommendations regarding this.     Further orders per Dr. Dominguez upon his evaluation of the patient.     Thank you for the consultation, cardiology will gladly continue to follow.     Karon Maguire PA-C        Please note this cardiology consultation note is the result of a face to face consultation with the patient, in addition to reviewing medical records at length by myself, Karon Maguire PA-C.    Time: appx 35 minutes

## 2018-03-02 NOTE — DISCHARGE SUMMARY
[facility] Medicine Services  DISCHARGE SUMMARY       Date of Admission: 3/1/2018  Date of Discharge:  3/2/2018  Primary Care Physician: Estefania Rodriguez MD    Presenting Problem/History of Present Illness:  Chest pain, unspecified type [R07.9]     Final Discharge Diagnoses:  Chest pain  Elevated troponin  Atrial fibrillation  Parkinsons disease  Hypothyroid  Hypertension    Consults: Cardiology    Procedures Performed: none    Pertinent Test Results:    Collected: 03/02/18 0519        Lab Status: Final result Specimen: Blood Updated: 03/02/18 0638        Troponin I 0.735 (C) ng/mL        Lipid Panel [[id number]] (Abnormal) Collected: 03/02/18 0519       Lab Status: Final result Specimen: Blood Updated: 03/02/18 0940        Total Cholesterol 112 (L) mg/dL         Triglycerides 50 mg/dL         HDL Cholesterol 26 (L) mg/dL         LDL Cholesterol  77 mg/dL         LDL/HDL Ratio 2.92       Troponin [[id number]] (Abnormal) Collected: 03/02/18 0118       Lab Status: Final result Specimen: Blood Updated: 03/02/18 0255        Troponin I 0.790 (C) ng/mL        CBC Auto Differential [[id number]] (Abnormal) Collected: 03/02/18 0118       Lab Status: Final result Specimen: Blood Updated: 03/02/18 0225        WBC 7.05 10*3/mm3         RBC 4.22 (L) 10*6/mm3         Hemoglobin 10.5 (L) g/dL         Hematocrit 32.6 (L) %         MCV 77.3 (L) fL         MCH 24.9 (L) pg         MCHC 32.2 (L) g/dL         RDW 15.9 (H) %         RDW-SD 43.8 fl         MPV 10.7 fL         Platelets 187 10*3/mm3         Neutrophil % 75.9 %         Lymphocyte % 16.9 %         Monocyte % 5.5 %         Eosinophil % 0.9 %         Basophil % 0.4 %         Immature Grans % 0.4 %         Neutrophils, Absolute 5.35 10*3/mm3         Lymphocytes, Absolute 1.19 10*3/mm3         Monocytes, Absolute 0.39 10*3/mm3         Eosinophils, Absolute 0.06 10*3/mm3         Basophils, Absolute 0.03 10*3/mm3         Immature Grans,  Absolute 0.03 10*3/mm3         nRBC 0.0 /100 WBC        Comprehensive Metabolic Panel [[id number]] (Abnormal) Collected: 03/02/18 0118       Lab Status: Final result Specimen: Blood Updated: 03/02/18 0240        Glucose 98 mg/dL         BUN 17 mg/dL         Creatinine 1.10 mg/dL         Sodium 143 mmol/L         Potassium 4.4 mmol/L         Chloride 104 mmol/L         CO2 30.0 mmol/L         Calcium 8.8 mg/dL         Total Protein 6.2 (L) g/dL         Albumin 3.30 (L) g/dL         ALT (SGPT) <15 U/L         AST (SGOT) 14 U/L         Alkaline Phosphatase 58 U/L         Total Bilirubin 0.6 mg/dL         eGFR Non African Amer 63 mL/min/1.73         Globulin 2.9 gm/dL         A/G Ratio 1.1 g/dL         BUN/Creatinine Ratio 15.5        Anion Gap 9.0 mmol/L        Narrative:         The MDRD GFR formula is only valid for adults with stable renal function between ages 18 and 70.       Magnesium [[id number]] (Normal) Collected: 03/02/18 0118       Lab Status: Final result Specimen: Blood Updated: 03/02/18 0240        Magnesium 2.1 mg/dL        Phosphorus [[id number]] (Normal) Collected: 03/02/18 0118       Lab Status: Final result Specimen: Blood Updated: 03/02/18 0240        Phosphorus 4.0 mg/dL        CK [[id number]] (Normal) Collected: 03/02/18 0118       Lab Status: Final result Specimen: Blood Updated: 03/02/18 0240        Creatine Kinase 34 U/L        Protime-INR [[id number]] (Abnormal) Collected: 03/02/18 0118       Lab Status: Final result Specimen: Blood Updated: 03/02/18 0233        Protime 17.1 (H) Seconds         INR 1.35 (H)       aPTT [[id number]] (Abnormal) Collected: 03/02/18 0118       Lab Status: Final result Specimen: Blood Updated: 03/02/18 0233        PTT 53.6 (H) seconds        Hemoglobin A1c [[id number]] Collected: 03/02/18 0118       Lab Status: Final result Specimen: Blood Updated: 03/02/18 0951        Hemoglobin A1C 5.2 %        Narrative:         Less than 6.0           Non-Diabetic Range  6.0-7.0                  ADA Therapeutic Target  Greater than 7.0        Action Suggested       Urinalysis With / Culture If Indicated - Urine, Clean Catch [[id number]] (Abnormal) Collected: 03/01/18 2254       Lab Status: Final result Specimen: Urine from Urine, Clean Catch Updated: 03/01/18 2307        Color, UA Yellow        Appearance, UA Clear        pH, UA <=5.0        Specific Gravity, UA >1.030 (H)        Glucose, UA Negative        Ketones, UA Negative        Bilirubin, UA Negative        Blood, UA Negative        Protein, UA Negative        Leuk Esterase, UA Negative        Nitrite, UA Negative        Urobilinogen, UA 0.2 E.U./dL       Narrative:         Urine microscopic not indicated.       Troponin [[id number]] (Abnormal) Collected: 03/01/18 1954       Lab Status: Final result Specimen: Blood Updated: 03/01/18 2033        Troponin I 0.320 (H) ng/mL        Protime-INR [[id number]] (Abnormal) Collected: 03/01/18 1609       Lab Status: Final result Specimen: Blood Updated: 03/01/18 1625        Protime 17.2 (H) Seconds         INR 1.36 (H)       aPTT [[id number]] (Abnormal) Collected: 03/01/18 1609       Lab Status: Final result Specimen: Blood Updated: 03/01/18 1625        PTT 48.7 (H) seconds        D-dimer, Quantitative [[id number]] (Normal) Collected: 03/01/18 1609       Lab Status: Final result Specimen: Blood Updated: 03/01/18 1627        D-Dimer, Quantitative 0.39 mg/L (FEU)        Narrative:         Reference Range is 0-0.50 mg/L FEU. However, results <0.50 mg/L FEU tends to rule out DVT or PE. Results >0.50 mg/L FEU are not useful in predicting absence or presence of DVT or PE.       Troponin [[id number]] (Normal) Collected: 03/01/18 1555       Lab Status: Final result Specimen: Blood Updated: 03/01/18 1635        Troponin I 0.019 ng/mL        SCANNED - LABS [[id number]] Resulted: 02/14/18        Lab Status: Final result         Chief Complaint on Day of Discharge: wants to go home.     History of Present Illness on Day  "of Discharge: No chest pain currently     Hospital Course:  The patient is a 89 y.o. male who presented to [facility] with   Chest pain.  He was admitted.   Evaluated with serial lab, cardiology consulted.   Patient desires not intervention, med changes or tests.    Will discharge to follow up with PCP Dr Rodriguez.       Condition on Discharge:  stable    Physical Exam on Discharge:  /96 (BP Location: Right arm, Patient Position: Sitting)  Pulse 74  Temp 97.2 °F (36.2 °C) (Temporal Artery )   Resp 16  Ht 180.3 cm (71\")  Wt 100 kg (221 lb 2 oz)  SpO2 97%  BMI 30.84 kg/m2  Physical Exam   Constitutional: He is oriented to person, place, and time. He appears well-developed and well-nourished.   Eyes: Conjunctivae and EOM are normal. Pupils are equal, round, and reactive to light.   Neck: Normal range of motion. Neck supple. No JVD present.   Cardiovascular: Normal rate, regular rhythm, normal heart sounds and intact distal pulses.    Pulmonary/Chest: Effort normal and breath sounds normal.   Abdominal: Soft. Bowel sounds are normal.   Musculoskeletal: Normal range of motion.   Neurological: He is alert and oriented to person, place, and time.   Skin: Skin is warm and dry.   Psychiatric: He has a normal mood and affect. His behavior is normal.   Nursing note and vitals reviewed.        Discharge Disposition:  Home or Self Care    Discharge Medications:   Javier Whitfield   Home Medication Instructions VENU:[id number]    Printed on:03/02/18 2193   Medication Information                      aspirin 81 MG EC tablet  Take 1 tablet by mouth Daily.             carbidopa-levodopa (SINEMET)  MG per tablet  Take  by mouth.             dabigatran etexilate (PRADAXA) 75 MG capsule  Take 75 mg by mouth 2 (Two) Times a Day.             entacapone (COMTAN) 200 MG tablet  Take  by mouth.             finasteride (PROSCAR) 5 MG tablet  TAKE ONE TABLET BY MOUTH ONCE DAILY             levothyroxine (SYNTHROID, " LEVOTHROID) 75 MCG tablet  Take 75 mcg by mouth Daily.             lisinopril (PRINIVIL,ZESTRIL) 2.5 MG tablet  Take 2.5 mg by mouth Daily.             SIMVASTATIN PO  Take  by mouth.             VALSARTAN PO  Take  by mouth.                 Discharge Diet:   Diet Instructions     Diet: Regular, Cardiac       Discharge Diet:   Regular  Cardiac                    Activity at Discharge:   Activity Instructions     Activity as Tolerated                   Follow-up Appointments:   PCP one week    Test Results Pending at Discharge: none    Maliha Warren DO  03/02/18  2:05 PM    Time: 40 minutes

## 2018-03-08 ENCOUNTER — OFFICE VISIT (OUTPATIENT)
Dept: NEUROLOGY | Age: 83
End: 2018-03-08
Payer: MEDICARE

## 2018-03-08 VITALS
WEIGHT: 225 LBS | SYSTOLIC BLOOD PRESSURE: 124 MMHG | DIASTOLIC BLOOD PRESSURE: 72 MMHG | HEIGHT: 71 IN | BODY MASS INDEX: 31.5 KG/M2

## 2018-03-08 DIAGNOSIS — G20 PARKINSON DISEASE (HCC): Primary | ICD-10-CM

## 2018-03-08 DIAGNOSIS — I65.1 BASILAR ARTERY STENOSIS: ICD-10-CM

## 2018-03-08 DIAGNOSIS — G62.9 NEUROPATHY: ICD-10-CM

## 2018-03-08 DIAGNOSIS — I25.709 CORONARY ARTERY DISEASE INVOLVING CORONARY BYPASS GRAFT OF NATIVE HEART WITH ANGINA PECTORIS (HCC): ICD-10-CM

## 2018-03-08 PROCEDURE — 99214 OFFICE O/P EST MOD 30 MIN: CPT | Performed by: PSYCHIATRY & NEUROLOGY

## 2018-03-08 PROCEDURE — G8598 ASA/ANTIPLAT THER USED: HCPCS | Performed by: PSYCHIATRY & NEUROLOGY

## 2018-03-08 PROCEDURE — G8417 CALC BMI ABV UP PARAM F/U: HCPCS | Performed by: PSYCHIATRY & NEUROLOGY

## 2018-03-08 PROCEDURE — 4040F PNEUMOC VAC/ADMIN/RCVD: CPT | Performed by: PSYCHIATRY & NEUROLOGY

## 2018-03-08 PROCEDURE — G8484 FLU IMMUNIZE NO ADMIN: HCPCS | Performed by: PSYCHIATRY & NEUROLOGY

## 2018-03-08 PROCEDURE — 1036F TOBACCO NON-USER: CPT | Performed by: PSYCHIATRY & NEUROLOGY

## 2018-03-08 PROCEDURE — 1123F ACP DISCUSS/DSCN MKR DOCD: CPT | Performed by: PSYCHIATRY & NEUROLOGY

## 2018-03-08 PROCEDURE — G8427 DOCREV CUR MEDS BY ELIG CLIN: HCPCS | Performed by: PSYCHIATRY & NEUROLOGY

## 2018-03-08 RX ORDER — DABIGATRAN ETEXILATE 75 MG/1
75 CAPSULE, COATED PELLETS ORAL 2 TIMES DAILY
COMMUNITY

## 2018-03-08 RX ORDER — LISINOPRIL 2.5 MG/1
2.5 TABLET ORAL DAILY
COMMUNITY

## 2018-03-20 ENCOUNTER — TELEPHONE (OUTPATIENT)
Dept: CARDIOLOGY | Age: 83
End: 2018-03-20

## 2018-06-06 PROBLEM — I25.10 ATHEROSCLEROTIC CORONARY VASCULAR DISEASE: Status: ACTIVE | Noted: 2018-06-06

## 2018-10-17 NOTE — PROGRESS NOTES
PRE-SEDATION ASSESSMENT    CONSENT  Consent for procedure and sedation obtained: Yes    MEDICAL HISTORY  Significant medical/surgical history: No  Past Complications with Sedation/Anesthesia: No  Significant Family History: No  Smoking History: Yes  Alcohol/Drug abuse: No  Possible Pregnancy (LMP): No  Cardiac History: No  Respiratory History: No    PHYSICAL EXAM  History and Physical Reviewed: H&P completed today  Airway Risk History: No previous complications  Airway Anatomy : Class III  Heart : Normal  Lungs : Normal  LOC/Mental Status : Normal    OTHER FINDINGS  Reviewed current medications and allergies: Yes  Pertinent lab/diagnostic test reviewed: Yes    SEDATION RISK ASSESSMENT  Risk Status ASA: Class II - Normal patient with mild systemic disease  Plan for Sedation: Moderate Sedation  Indications for Procedure/Pre-Procedure Diagnosis and Planned Procedure: Multiple myeloma  EKG Monitoring: Yes    NARRATIVE FINDINGS  Narrative Findings: Pheresis catheter placement   Subjective    Mr. Whitfield is 89 y.o. 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    He comes back today for follow-up once again his PSA is 0.    He has no sniffing voiding symptoms no gross materia no flank pain no symptoms of metastatic disease such as bone pain back pain weight loss or anorexia he is somewhat fatigued but again I think is consistent with his age is nothing acute that according to the patient.    He also is always had some frequency urgency and overactive bladder symptoms these have not changed any and certainly has not worsened any recently      History of Present Illness      The following portions of the patient's history were reviewed and updated as appropriate: allergies, current medications, past family history, past medical history, past social history, past surgical history and problem list.    Review of Systems   Constitutional: Positive for fatigue. Negative for activity change and fever.   Gastrointestinal: Negative for abdominal distention, abdominal pain, blood in stool and nausea.   Genitourinary: Negative for decreased urine volume, difficulty urinating, discharge, dysuria, enuresis, flank pain, frequency, genital sores, hematuria, penile pain, penile swelling, scrotal swelling, testicular pain and urgency.   Neurological: Negative for light-headedness.   Psychiatric/Behavioral: Negative.  Negative for agitation and confusion.         Current Outpatient Prescriptions:   •  aspirin 81 MG EC tablet, Take 1 tablet by mouth Daily., Disp: , Rfl:   •  carbidopa-levodopa (SINEMET)  MG per tablet, Take  by mouth., Disp: , Rfl:   •  dabigatran etexilate (PRADAXA) 75 MG capsule, Take 75 mg by mouth 2 (Two) Times a Day., Disp: , Rfl:  "  •  entacapone (COMTAN) 200 MG tablet, Take  by mouth., Disp: , Rfl:   •  finasteride (PROSCAR) 5 MG tablet, TAKE ONE TABLET BY MOUTH ONCE DAILY, Disp: 90 tablet, Rfl: 0  •  levothyroxine (SYNTHROID, LEVOTHROID) 75 MCG tablet, Take 75 mcg by mouth Daily., Disp: , Rfl:   •  lisinopril (PRINIVIL,ZESTRIL) 2.5 MG tablet, Take 2.5 mg by mouth Daily., Disp: , Rfl:   •  SIMVASTATIN PO, Take  by mouth., Disp: , Rfl:   •  VALSARTAN PO, Take  by mouth., Disp: , Rfl:     Past Medical History:   Diagnosis Date   • Atrial fibrillation (CMS/HCC)    • 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3 cm (71\")   Wt 98 kg (216 lb)   BMI 30.13 kg/m²     Physical Exam      Admission on 03/01/2018, Discharged on 03/02/2018   Component Date Value Ref Range Status   • Troponin I 03/01/2018 0.019  0.000 - 0.034 ng/mL Final   • Protime 03/01/2018 17.2* 11.9 - 14.6 Seconds Final   • INR 03/01/2018 1.36* 0.91 - 1.09 Final   • PTT 03/01/2018 48.7* 24.1 - 34.8 seconds Final   • D-Dimer, Quantitative 03/01/2018 0.39  0.00 - 0.50 mg/L (FEU) Final   • Troponin I 03/01/2018 0.320* 0.000 - 0.034 ng/mL Final   • Troponin I 03/02/2018 0.735* 0.000 - 0.034 ng/mL Final   • Troponin I 03/02/2018 0.790* 0.000 - 0.034 ng/mL Final   • Color, UA 03/01/2018 Yellow  Yellow, Straw Final   • Appearance, UA 03/01/2018 Clear  Clear Final   • pH, UA " 03/01/2018 <=5.0  5.0 - 8.0 Final   • Specific Gravity, UA 03/01/2018 >1.030* 1.005 - 1.030 Final   • Glucose, UA 03/01/2018 Negative  Negative Final   • Ketones, UA 03/01/2018 Negative  Negative Final   • Bilirubin, UA 03/01/2018 Negative  Negative Final   • Blood, UA 03/01/2018 Negative  Negative Final   • Protein, UA 03/01/2018 Negative  Negative Final   • Leuk Esterase, UA 03/01/2018 Negative  Negative Final   • Nitrite, UA 03/01/2018 Negative  Negative Final   • Urobilinogen, UA 03/01/2018 0.2 E.U./dL  0.2 - 1.0 E.U./dL Final   • WBC 03/02/2018 7.05  4.80 - 10.80 10*3/mm3 Final   • RBC 03/02/2018 4.22* 4.80 - 5.90 10*6/mm3 Final   • Hemoglobin 03/02/2018 10.5* 14.0 - 18.0 g/dL Final   • Hematocrit 03/02/2018 32.6* 40.0 - 52.0 % Final   • MCV 03/02/2018 77.3* 82.0 - 95.0 fL Final   • MCH 03/02/2018 24.9* 28.0 - 32.0 pg Final   • MCHC 03/02/2018 32.2* 33.0 - 36.0 g/dL Final   • RDW 03/02/2018 15.9* 12.0 - 15.0 % Final   • RDW-SD 03/02/2018 43.8  40.0 - 54.0 fl Final   • MPV 03/02/2018 10.7  6.0 - 12.0 fL Final   • Platelets 03/02/2018 187  130 - 400 10*3/mm3 Final   • Neutrophil % 03/02/2018 75.9  39.0 - 78.0 % Final   • Lymphocyte % 03/02/2018 16.9  15.0 - 45.0 % Final   • Monocyte % 03/02/2018 5.5  4.0 - 12.0 % Final   • Eosinophil % 03/02/2018 0.9  0.0 - 4.0 % Final   • Basophil % 03/02/2018 0.4  0.0 - 2.0 % Final   • Immature Grans % 03/02/2018 0.4  0.0 - 5.0 % Final   • Neutrophils, Absolute 03/02/2018 5.35  1.87 - 8.40 10*3/mm3 Final   • Lymphocytes, Absolute 03/02/2018 1.19  0.72 - 4.86 10*3/mm3 Final   • Monocytes, Absolute 03/02/2018 0.39  0.19 - 1.30 10*3/mm3 Final   • Eosinophils, Absolute 03/02/2018 0.06  0.00 - 0.70 10*3/mm3 Final   • Basophils, Absolute 03/02/2018 0.03  0.00 - 0.20 10*3/mm3 Final   • Immature Grans, Absolute 03/02/2018 0.03  0.00 - 0.03 10*3/mm3 Final   • nRBC 03/02/2018 0.0  0.0 - 0.0 /100 WBC Final   • Glucose 03/02/2018 98  70 - 100 mg/dL Final   • BUN 03/02/2018 17  5 - 21 mg/dL  Final   • Creatinine 03/02/2018 1.10  0.50 - 1.40 mg/dL Final   • Sodium 03/02/2018 143  135 - 145 mmol/L Final   • Potassium 03/02/2018 4.4  3.5 - 5.3 mmol/L Final   • Chloride 03/02/2018 104  98 - 110 mmol/L Final   • CO2 03/02/2018 30.0  24.0 - 31.0 mmol/L Final   • Calcium 03/02/2018 8.8  8.4 - 10.4 mg/dL Final   • Total Protein 03/02/2018 6.2* 6.3 - 8.7 g/dL Final   • Albumin 03/02/2018 3.30* 3.50 - 5.00 g/dL Final   • ALT (SGPT) 03/02/2018 <15  0 - 54 U/L Final   • AST (SGOT) 03/02/2018 14  7 - 45 U/L Final   • Alkaline Phosphatase 03/02/2018 58  24 - 120 U/L Final   • Total Bilirubin 03/02/2018 0.6  0.1 - 1.0 mg/dL Final   • eGFR Non African Amer 03/02/2018 63  >60 mL/min/1.73 Final   • Globulin 03/02/2018 2.9  gm/dL Final   • A/G Ratio 03/02/2018 1.1  1.1 - 2.5 g/dL Final   • BUN/Creatinine Ratio 03/02/2018 15.5  7.0 - 25.0 Final   • Anion Gap 03/02/2018 9.0  4.0 - 13.0 mmol/L Final   • Magnesium 03/02/2018 2.1  1.4 - 2.2 mg/dL Final   • Phosphorus 03/02/2018 4.0  2.5 - 4.5 mg/dL Final   • Creatine Kinase 03/02/2018 34  0 - 203 U/L Final   • Protime 03/02/2018 17.1* 11.9 - 14.6 Seconds Final   • INR 03/02/2018 1.35* 0.91 - 1.09 Final   • PTT 03/02/2018 53.6* 24.1 - 34.8 seconds Final   • Total Cholesterol 03/02/2018 112* 130 - 200 mg/dL Final   • Triglycerides 03/02/2018 50  0 - 149 mg/dL Final   • HDL Cholesterol 03/02/2018 26* >=40 mg/dL Final   • LDL Cholesterol  03/02/2018 77  0 - 99 mg/dL Final   • LDL/HDL Ratio 03/02/2018 2.92   Final   • Hemoglobin A1C 03/02/2018 5.2  % Final       Results for orders placed or performed during the hospital encounter of 03/01/18   Troponin   Result Value Ref Range    Troponin I 0.019 0.000 - 0.034 ng/mL   Protime-INR   Result Value Ref Range    Protime 17.2 (H) 11.9 - 14.6 Seconds    INR 1.36 (H) 0.91 - 1.09   aPTT   Result Value Ref Range    PTT 48.7 (H) 24.1 - 34.8 seconds   D-dimer, Quantitative   Result Value Ref Range    D-Dimer, Quantitative 0.39 0.00 - 0.50 mg/L  (FEU)   Troponin   Result Value Ref Range    Troponin I 0.320 (H) 0.000 - 0.034 ng/mL   Troponin   Result Value Ref Range    Troponin I 0.735 (C) 0.000 - 0.034 ng/mL   Troponin   Result Value Ref Range    Troponin I 0.790 (C) 0.000 - 0.034 ng/mL   Urinalysis With / Culture If Indicated - Urine, Clean Catch   Result Value Ref Range    Color, UA Yellow Yellow, Straw    Appearance, UA Clear Clear    pH, UA <=5.0 5.0 - 8.0    Specific Gravity, UA >1.030 (H) 1.005 - 1.030    Glucose, UA Negative Negative    Ketones, UA Negative Negative    Bilirubin, UA Negative Negative    Blood, UA Negative Negative    Protein, UA Negative Negative    Leuk Esterase, UA Negative Negative    Nitrite, UA Negative Negative    Urobilinogen, UA 0.2 E.U./dL 0.2 - 1.0 E.U./dL   CBC Auto Differential   Result Value Ref Range    WBC 7.05 4.80 - 10.80 10*3/mm3    RBC 4.22 (L) 4.80 - 5.90 10*6/mm3    Hemoglobin 10.5 (L) 14.0 - 18.0 g/dL    Hematocrit 32.6 (L) 40.0 - 52.0 %    MCV 77.3 (L) 82.0 - 95.0 fL    MCH 24.9 (L) 28.0 - 32.0 pg    MCHC 32.2 (L) 33.0 - 36.0 g/dL    RDW 15.9 (H) 12.0 - 15.0 %    RDW-SD 43.8 40.0 - 54.0 fl    MPV 10.7 6.0 - 12.0 fL    Platelets 187 130 - 400 10*3/mm3    Neutrophil % 75.9 39.0 - 78.0 %    Lymphocyte % 16.9 15.0 - 45.0 %    Monocyte % 5.5 4.0 - 12.0 %    Eosinophil % 0.9 0.0 - 4.0 %    Basophil % 0.4 0.0 - 2.0 %    Immature Grans % 0.4 0.0 - 5.0 %    Neutrophils, Absolute 5.35 1.87 - 8.40 10*3/mm3    Lymphocytes, Absolute 1.19 0.72 - 4.86 10*3/mm3    Monocytes, Absolute 0.39 0.19 - 1.30 10*3/mm3    Eosinophils, Absolute 0.06 0.00 - 0.70 10*3/mm3    Basophils, Absolute 0.03 0.00 - 0.20 10*3/mm3    Immature Grans, Absolute 0.03 0.00 - 0.03 10*3/mm3    nRBC 0.0 0.0 - 0.0 /100 WBC   Comprehensive Metabolic Panel   Result Value Ref Range    Glucose 98 70 - 100 mg/dL    BUN 17 5 - 21 mg/dL    Creatinine 1.10 0.50 - 1.40 mg/dL    Sodium 143 135 - 145 mmol/L    Potassium 4.4 3.5 - 5.3 mmol/L    Chloride 104 98 - 110  mmol/L    CO2 30.0 24.0 - 31.0 mmol/L    Calcium 8.8 8.4 - 10.4 mg/dL    Total Protein 6.2 (L) 6.3 - 8.7 g/dL    Albumin 3.30 (L) 3.50 - 5.00 g/dL    ALT (SGPT) <15 0 - 54 U/L    AST (SGOT) 14 7 - 45 U/L    Alkaline Phosphatase 58 24 - 120 U/L    Total Bilirubin 0.6 0.1 - 1.0 mg/dL    eGFR Non African Amer 63 >60 mL/min/1.73    Globulin 2.9 gm/dL    A/G Ratio 1.1 1.1 - 2.5 g/dL    BUN/Creatinine Ratio 15.5 7.0 - 25.0    Anion Gap 9.0 4.0 - 13.0 mmol/L   Magnesium   Result Value Ref Range    Magnesium 2.1 1.4 - 2.2 mg/dL   Phosphorus   Result Value Ref Range    Phosphorus 4.0 2.5 - 4.5 mg/dL   CK   Result Value Ref Range    Creatine Kinase 34 0 - 203 U/L   Protime-INR   Result Value Ref Range    Protime 17.1 (H) 11.9 - 14.6 Seconds    INR 1.35 (H) 0.91 - 1.09   aPTT   Result Value Ref Range    PTT 53.6 (H) 24.1 - 34.8 seconds   Lipid Panel   Result Value Ref Range    Total Cholesterol 112 (L) 130 - 200 mg/dL    Triglycerides 50 0 - 149 mg/dL    HDL Cholesterol 26 (L) >=40 mg/dL    LDL Cholesterol  77 0 - 99 mg/dL    LDL/HDL Ratio 2.92    Hemoglobin A1c   Result Value Ref Range    Hemoglobin A1C 5.2 %       Assessment and Plan    Diagnoses and all orders for this visit:    Cancer of prostate (CMS/[facility])  -     Cancel: POC Urinalysis Dipstick, Multipro  -     PSA DIAGNOSTIC; Future    OAB (overactive bladder)    Plan--patient seems doing well we will see him back again in 1 year there is no evidence of recurrence of his prostate cancer.    He still has irritative symptoms but these are pretty much stable so he does not want any treatment for that

## 2018-12-12 PROBLEM — G20 PRIMARY PARKINSONISM (HCC): Status: ACTIVE | Noted: 2018-12-12

## 2018-12-14 ENCOUNTER — TELEPHONE (OUTPATIENT)
Dept: INTERNAL MEDICINE CLINIC | Age: 83
End: 2018-12-14

## 2019-01-01 ENCOUNTER — OUTSIDE FACILITY SERVICE (OUTPATIENT)
Dept: PODIATRY | Facility: CLINIC | Age: 84
End: 2019-01-01

## 2019-01-01 PROCEDURE — 11721 DEBRIDE NAIL 6 OR MORE: CPT | Performed by: PODIATRIST

## 2019-04-29 ENCOUNTER — TELEPHONE (OUTPATIENT)
Dept: INTERNAL MEDICINE CLINIC | Age: 84
End: 2019-04-29

## 2019-04-29 NOTE — TELEPHONE ENCOUNTER
Hospice pt - Dx Parkinsons, afib , atherosclerosis  Pt is c/o pain to left side and reports he has an inguinal hernia   Anselmo Shone wants to add either ibuprofen or naproxen     Please advise what to add    No known allergies

## 2019-05-31 ENCOUNTER — TELEPHONE (OUTPATIENT)
Dept: INTERNAL MEDICINE CLINIC | Age: 84
End: 2019-05-31

## 2019-05-31 NOTE — TELEPHONE ENCOUNTER
Hospice pt Dx Parkinsons, afib   Pt is having a tooth extracted next Wednesday - and is taking Pradaxa 75 mg BID    when should they start to hold that pradaxa  ?      Please advise

## 2019-06-04 ENCOUNTER — TELEPHONE (OUTPATIENT)
Dept: INTERNAL MEDICINE CLINIC | Age: 84
End: 2019-06-04

## 2019-07-15 ENCOUNTER — TELEPHONE (OUTPATIENT)
Dept: INTERNAL MEDICINE CLINIC | Age: 84
End: 2019-07-15

## 2019-07-15 NOTE — TELEPHONE ENCOUNTER
Hospice pt Dx Parkinsons , atherscerosis,hx MI on 2/14/19, hx prostate cancer and BPH   Adelita Double reporting that that pt has Heart rate of 45, BP is 110/60  Pt is currently taking :    Lisinopril 2.5 mg daily  Sinemet 25/250 mg 4 X a day   comtan 200 mg daily   Proscar 5 mg daily   Synthroid 12.5 mg daily   Asa 81 mg daily   pradaxa - that is being changed to eloquis when new rx needed   temazepam 15 mg at HS     Anything to change to bring that heart rate up ?      He has been having SOA this weekend and has Roxanol ordered for that as well

## 2019-07-22 ENCOUNTER — TELEPHONE (OUTPATIENT)
Dept: INTERNAL MEDICINE CLINIC | Age: 84
End: 2019-07-22